# Patient Record
Sex: MALE | Race: WHITE | NOT HISPANIC OR LATINO | URBAN - METROPOLITAN AREA
[De-identification: names, ages, dates, MRNs, and addresses within clinical notes are randomized per-mention and may not be internally consistent; named-entity substitution may affect disease eponyms.]

---

## 2017-03-20 ENCOUNTER — EMERGENCY (EMERGENCY)
Facility: HOSPITAL | Age: 47
LOS: 1 days | Discharge: HOME | End: 2017-03-20
Attending: EMERGENCY MEDICINE | Admitting: EMERGENCY MEDICINE
Payer: MEDICAID

## 2017-03-20 VITALS
RESPIRATION RATE: 18 BRPM | DIASTOLIC BLOOD PRESSURE: 77 MMHG | SYSTOLIC BLOOD PRESSURE: 132 MMHG | OXYGEN SATURATION: 99 % | TEMPERATURE: 98 F | HEART RATE: 102 BPM

## 2017-03-20 DIAGNOSIS — E11.9 TYPE 2 DIABETES MELLITUS WITHOUT COMPLICATIONS: ICD-10-CM

## 2017-03-20 DIAGNOSIS — M79.662 PAIN IN LEFT LOWER LEG: ICD-10-CM

## 2017-03-20 DIAGNOSIS — R20.0 ANESTHESIA OF SKIN: ICD-10-CM

## 2017-03-20 PROCEDURE — 99283 EMERGENCY DEPT VISIT LOW MDM: CPT

## 2017-03-20 NOTE — ED PROVIDER NOTE - MEDICAL DECISION MAKING DETAILS
numbness in LE, sensation on exam intact b/l, no crepitus, no deformity, no obvious swelling, no crepitus, pulses intact distally, will US, no indication for emergent neuroimaging, low suspicion for acute stroke

## 2017-03-20 NOTE — ED PROVIDER NOTE - PROGRESS NOTE DETAILS
pt declines US, will return if sx not improving or worsening, explained w/o US, may miss a potentially life threatening condition, pt understands, has capacity, still declines diagnostic workup.

## 2017-03-20 NOTE — ED PROVIDER NOTE - PHYSICAL EXAMINATION
CON: ao x 3, HENMT: clear oropharynx, soft neck, HEAD: atraumatic, SKIN: no rash, MSK: no LE edema, L calf tender, neg Felix, full ROM w/ dorsi/plantarflexion, no crepitus, no deformity, NEURO: strength 5/5 in dorsi/plantarflexion, strength 5/5 b/l hip flexion, ambulatory steadily, CON: ao x 3, HENMT: clear oropharynx, soft neck, HEAD: atraumatic, SKIN: no rash, MSK: no LE edema, L calf tender, neg Felix, full ROM w/ dorsi/plantarflexion, no crepitus, no deformity, no obvious swelling noted, pedal pulses intact, NEURO: strength 5/5 in dorsi/plantarflexion, strength 5/5 b/l hip flexion, ambulatory steadily,

## 2017-03-20 NOTE — ED PROVIDER NOTE - OBJECTIVE STATEMENT
46 yom pw L calf "numbness, cramp and pain" x 1 day.  states works a lot on his feet, climbing ladders.  no trauma, no immobilization, no hx of PE/DVT.  hx of DM, states the numbness feels like pins and needles, improving as the day went by.  No headache, no weakness, no change in speech, no fever.  no other complaints.

## 2017-03-21 ENCOUNTER — EMERGENCY (EMERGENCY)
Facility: HOSPITAL | Age: 47
LOS: 1 days | Discharge: LEFT BEFORE DISCHARGE | End: 2017-03-21
Attending: EMERGENCY MEDICINE | Admitting: EMERGENCY MEDICINE
Payer: MEDICAID

## 2017-03-21 VITALS
DIASTOLIC BLOOD PRESSURE: 85 MMHG | RESPIRATION RATE: 18 BRPM | SYSTOLIC BLOOD PRESSURE: 142 MMHG | OXYGEN SATURATION: 100 % | TEMPERATURE: 98 F | HEART RATE: 70 BPM

## 2017-03-21 VITALS
RESPIRATION RATE: 16 BRPM | HEART RATE: 85 BPM | DIASTOLIC BLOOD PRESSURE: 67 MMHG | SYSTOLIC BLOOD PRESSURE: 127 MMHG | OXYGEN SATURATION: 98 % | TEMPERATURE: 98 F

## 2017-03-21 DIAGNOSIS — M79.605 PAIN IN LEFT LEG: ICD-10-CM

## 2017-03-21 DIAGNOSIS — E11.9 TYPE 2 DIABETES MELLITUS WITHOUT COMPLICATIONS: ICD-10-CM

## 2017-03-21 DIAGNOSIS — R20.2 PARESTHESIA OF SKIN: ICD-10-CM

## 2017-03-21 PROCEDURE — 99284 EMERGENCY DEPT VISIT MOD MDM: CPT

## 2017-03-21 RX ORDER — KETOROLAC TROMETHAMINE 30 MG/ML
60 SYRINGE (ML) INJECTION ONCE
Qty: 0 | Refills: 0 | Status: COMPLETED | OUTPATIENT
Start: 2017-03-21 | End: 2017-03-21

## 2017-03-21 NOTE — ED PROVIDER NOTE - NS ED MD SCRIBE ATTENDING SCRIBE SECTIONS
RESULTS/HISTORY OF PRESENT ILLNESS/CONSULTATIONS/SHIFT CHANGE/PROGRESS NOTE/HIV/PHYSICAL EXAM/PAST MEDICAL/SURGICAL/SOCIAL HISTORY/VITAL SIGNS( Pullset)/INTAKE ASSESSMENT/SCREENINGS/DISPOSITION/REVIEW OF SYSTEMS

## 2017-03-21 NOTE — ED PROVIDER NOTE - OBJECTIVE STATEMENT
47 yo M w/ Hx of DM presents with persistent left calf pain since yesterday. Pt was seen in the ED yesterday but refused ultrasound. Pt returned today due to increased discomfort and pain radiating up to left thigh. Pt notes pain started after misstep on ladder (pt is a ); pt thought he twisted his ankle/calf. Pt also notes tingling in foot and is concerned b/c he has Hx of DM. Pt has not taken any pain meds.

## 2017-03-21 NOTE — ED PROVIDER NOTE - MUSCULOSKELETAL, MLM
Spine appears normal, range of motion is not limited, no leg swelling, +tenderness to medial gastrocnemius muscle.

## 2017-03-21 NOTE — ED PROVIDER NOTE - PROGRESS NOTE DETAILS
Patient expressed desire to not stay to an [facility] then eloped. DDimer neg. low risk for DVT. I suspect a medial gastroc strain.

## 2020-12-02 ENCOUNTER — HOSPITAL ENCOUNTER (EMERGENCY)
Facility: HOSPITAL | Age: 50
Discharge: HOME | End: 2020-12-02
Attending: EMERGENCY MEDICINE
Payer: COMMERCIAL

## 2020-12-02 VITALS
DIASTOLIC BLOOD PRESSURE: 80 MMHG | RESPIRATION RATE: 18 BRPM | OXYGEN SATURATION: 99 % | SYSTOLIC BLOOD PRESSURE: 130 MMHG | TEMPERATURE: 98.2 F | HEART RATE: 98 BPM | WEIGHT: 150 LBS

## 2020-12-02 DIAGNOSIS — H60.91 RIGHT OTITIS EXTERNA: Primary | ICD-10-CM

## 2020-12-02 DIAGNOSIS — I83.90 VARICOSE VEIN OF LEG: ICD-10-CM

## 2020-12-02 LAB
ALBUMIN SERPL BCP-MCNC: 4 G/DL (ref 3.5–5)
ALP SERPL-CCNC: 100 U/L (ref 46–116)
ALT SERPL W P-5'-P-CCNC: 61 U/L (ref 12–78)
ANION GAP SERPL CALCULATED.3IONS-SCNC: 13 MMOL/L (ref 4–13)
AST SERPL W P-5'-P-CCNC: 35 U/L (ref 5–45)
BASOPHILS # BLD AUTO: 0.06 THOUSANDS/ΜL (ref 0–0.1)
BASOPHILS NFR BLD AUTO: 1 % (ref 0–1)
BILIRUB DIRECT SERPL-MCNC: 0.15 MG/DL (ref 0–0.2)
BILIRUB SERPL-MCNC: 0.4 MG/DL (ref 0.2–1)
BUN SERPL-MCNC: 11 MG/DL (ref 5–25)
CALCIUM SERPL-MCNC: 8.9 MG/DL (ref 8.3–10.1)
CHLORIDE SERPL-SCNC: 103 MMOL/L (ref 100–108)
CO2 SERPL-SCNC: 25 MMOL/L (ref 21–32)
CREAT SERPL-MCNC: 0.81 MG/DL (ref 0.6–1.3)
EOSINOPHIL # BLD AUTO: 0.16 THOUSAND/ΜL (ref 0–0.61)
EOSINOPHIL NFR BLD AUTO: 3 % (ref 0–6)
ERYTHROCYTE [DISTWIDTH] IN BLOOD BY AUTOMATED COUNT: 11.9 % (ref 11.6–15.1)
GFR SERPL CREATININE-BSD FRML MDRD: 105 ML/MIN/1.73SQ M
GLUCOSE SERPL-MCNC: 208 MG/DL (ref 65–140)
GLUCOSE SERPL-MCNC: 234 MG/DL (ref 65–140)
HCT VFR BLD AUTO: 44.9 % (ref 36.5–49.3)
HGB BLD-MCNC: 15 G/DL (ref 12–17)
IMM GRANULOCYTES # BLD AUTO: 0.01 THOUSAND/UL (ref 0–0.2)
IMM GRANULOCYTES NFR BLD AUTO: 0 % (ref 0–2)
LYMPHOCYTES # BLD AUTO: 2.11 THOUSANDS/ΜL (ref 0.6–4.47)
LYMPHOCYTES NFR BLD AUTO: 33 % (ref 14–44)
MCH RBC QN AUTO: 33.1 PG (ref 26.8–34.3)
MCHC RBC AUTO-ENTMCNC: 33.4 G/DL (ref 31.4–37.4)
MCV RBC AUTO: 99 FL (ref 82–98)
MONOCYTES # BLD AUTO: 0.53 THOUSAND/ΜL (ref 0.17–1.22)
MONOCYTES NFR BLD AUTO: 8 % (ref 4–12)
NEUTROPHILS # BLD AUTO: 3.59 THOUSANDS/ΜL (ref 1.85–7.62)
NEUTS SEG NFR BLD AUTO: 55 % (ref 43–75)
NRBC BLD AUTO-RTO: 0 /100 WBCS
PLATELET # BLD AUTO: 302 THOUSANDS/UL (ref 149–390)
PMV BLD AUTO: 9.4 FL (ref 8.9–12.7)
POTASSIUM SERPL-SCNC: 3.9 MMOL/L (ref 3.5–5.3)
PROT SERPL-MCNC: 7.6 G/DL (ref 6.4–8.2)
RBC # BLD AUTO: 4.53 MILLION/UL (ref 3.88–5.62)
SODIUM SERPL-SCNC: 141 MMOL/L (ref 136–145)
WBC # BLD AUTO: 6.46 THOUSAND/UL (ref 4.31–10.16)

## 2020-12-02 PROCEDURE — 36415 COLL VENOUS BLD VENIPUNCTURE: CPT | Performed by: EMERGENCY MEDICINE

## 2020-12-02 PROCEDURE — 83036 HEMOGLOBIN GLYCOSYLATED A1C: CPT | Performed by: EMERGENCY MEDICINE

## 2020-12-02 PROCEDURE — 99284 EMERGENCY DEPT VISIT MOD MDM: CPT

## 2020-12-02 PROCEDURE — 82948 REAGENT STRIP/BLOOD GLUCOSE: CPT

## 2020-12-02 PROCEDURE — 85025 COMPLETE CBC W/AUTO DIFF WBC: CPT | Performed by: EMERGENCY MEDICINE

## 2020-12-02 PROCEDURE — 99284 EMERGENCY DEPT VISIT MOD MDM: CPT | Performed by: EMERGENCY MEDICINE

## 2020-12-02 PROCEDURE — 80076 HEPATIC FUNCTION PANEL: CPT | Performed by: EMERGENCY MEDICINE

## 2020-12-02 PROCEDURE — 80048 BASIC METABOLIC PNL TOTAL CA: CPT | Performed by: EMERGENCY MEDICINE

## 2020-12-02 RX ORDER — CIPROFLOXACIN 500 MG/1
500 TABLET, FILM COATED ORAL ONCE
Status: DISCONTINUED | OUTPATIENT
Start: 2020-12-02 | End: 2020-12-02 | Stop reason: HOSPADM

## 2020-12-02 RX ORDER — CIPROFLOXACIN AND DEXAMETHASONE 3; 1 MG/ML; MG/ML
4 SUSPENSION/ DROPS AURICULAR (OTIC) 2 TIMES DAILY
Qty: 7.5 ML | Refills: 0 | Status: SHIPPED | OUTPATIENT
Start: 2020-12-02 | End: 2020-12-18 | Stop reason: ALTCHOICE

## 2020-12-02 RX ORDER — CIPROFLOXACIN 500 MG/1
500 TABLET, FILM COATED ORAL 2 TIMES DAILY
Qty: 20 TABLET | Refills: 0 | Status: SHIPPED | OUTPATIENT
Start: 2020-12-02 | End: 2020-12-12

## 2020-12-02 RX ORDER — CIPROFLOXACIN AND DEXAMETHASONE 3; 1 MG/ML; MG/ML
4 SUSPENSION/ DROPS AURICULAR (OTIC) 2 TIMES DAILY
Status: DISCONTINUED | OUTPATIENT
Start: 2020-12-02 | End: 2020-12-02 | Stop reason: HOSPADM

## 2020-12-02 RX ADMIN — SODIUM CHLORIDE 1000 ML: 0.9 INJECTION, SOLUTION INTRAVENOUS at 18:05

## 2020-12-03 LAB
EST. AVERAGE GLUCOSE BLD GHB EST-MCNC: 206 MG/DL
HBA1C MFR BLD: 8.8 %

## 2020-12-04 ENCOUNTER — TELEPHONE (OUTPATIENT)
Dept: VASCULAR SURGERY | Facility: CLINIC | Age: 50
End: 2020-12-04

## 2020-12-07 ENCOUNTER — CONSULT (OUTPATIENT)
Dept: VASCULAR SURGERY | Facility: CLINIC | Age: 50
End: 2020-12-07
Payer: COMMERCIAL

## 2020-12-07 VITALS
BODY MASS INDEX: 22.88 KG/M2 | TEMPERATURE: 97.6 F | HEIGHT: 68 IN | DIASTOLIC BLOOD PRESSURE: 90 MMHG | SYSTOLIC BLOOD PRESSURE: 160 MMHG | HEART RATE: 84 BPM | WEIGHT: 151 LBS

## 2020-12-07 DIAGNOSIS — H60.91 RIGHT OTITIS EXTERNA: ICD-10-CM

## 2020-12-07 DIAGNOSIS — I83.811 VARICOSE VEINS OF LEG WITH PAIN, RIGHT: Primary | ICD-10-CM

## 2020-12-07 PROCEDURE — 99242 OFF/OP CONSLTJ NEW/EST SF 20: CPT | Performed by: SURGERY

## 2020-12-07 RX ORDER — GABAPENTIN 300 MG/1
300 CAPSULE ORAL 3 TIMES DAILY
COMMUNITY
Start: 2020-02-21 | End: 2021-02-20

## 2020-12-07 RX ORDER — UREA 10 %
400 LOTION (ML) TOPICAL DAILY
COMMUNITY
Start: 2020-05-26 | End: 2021-05-26

## 2020-12-07 RX ORDER — INSULIN GLARGINE 100 [IU]/ML
30 INJECTION, SOLUTION SUBCUTANEOUS DAILY
COMMUNITY
Start: 2020-11-22

## 2020-12-07 RX ORDER — ATORVASTATIN CALCIUM 40 MG/1
40 TABLET, FILM COATED ORAL DAILY
COMMUNITY
Start: 2020-03-11 | End: 2021-03-11

## 2020-12-07 RX ORDER — NAPROXEN 500 MG/1
500 TABLET ORAL 2 TIMES DAILY WITH MEALS
COMMUNITY
Start: 2020-09-08

## 2020-12-07 RX ORDER — CHOLECALCIFEROL (VITAMIN D3) 125 MCG
500 CAPSULE ORAL DAILY
COMMUNITY
Start: 2020-09-01

## 2020-12-07 RX ORDER — LISINOPRIL 2.5 MG/1
2.5 TABLET ORAL DAILY
COMMUNITY
Start: 2020-03-20 | End: 2021-03-20

## 2020-12-07 RX ORDER — INSULIN ASPART 100 [IU]/ML
INJECTION, SOLUTION INTRAVENOUS; SUBCUTANEOUS
COMMUNITY
Start: 2020-08-18

## 2020-12-18 RX ORDER — OFLOXACIN 3 MG/ML
10 SOLUTION AURICULAR (OTIC) DAILY
Qty: 5 ML | Refills: 0 | Status: SHIPPED | OUTPATIENT
Start: 2020-12-18

## 2021-07-27 NOTE — ED PROVIDER NOTE - GASTROINTESTINAL, MLM
Abdomen soft, non-tender, no guarding. Normal vision: sees adequately in most situations; can see medication labels, newsprint

## 2021-10-05 ENCOUNTER — APPOINTMENT (OUTPATIENT)
Dept: LAB | Facility: CLINIC | Age: 51
End: 2021-10-05
Payer: COMMERCIAL

## 2021-10-05 DIAGNOSIS — E10.40 DIABETIC NEUROPATHY, TYPE I DIABETES MELLITUS (HCC): ICD-10-CM

## 2021-10-05 LAB
ALBUMIN SERPL BCP-MCNC: 3.8 G/DL (ref 3.5–5)
ALP SERPL-CCNC: 94 U/L (ref 46–116)
ALT SERPL W P-5'-P-CCNC: 31 U/L (ref 12–78)
ANION GAP SERPL CALCULATED.3IONS-SCNC: 4 MMOL/L (ref 4–13)
AST SERPL W P-5'-P-CCNC: 17 U/L (ref 5–45)
BILIRUB SERPL-MCNC: 0.42 MG/DL (ref 0.2–1)
BUN SERPL-MCNC: 15 MG/DL (ref 5–25)
CALCIUM SERPL-MCNC: 9.4 MG/DL (ref 8.3–10.1)
CHLORIDE SERPL-SCNC: 102 MMOL/L (ref 100–108)
CHOLEST SERPL-MCNC: 135 MG/DL (ref 50–200)
CO2 SERPL-SCNC: 28 MMOL/L (ref 21–32)
CREAT SERPL-MCNC: 0.84 MG/DL (ref 0.6–1.3)
GFR SERPL CREATININE-BSD FRML MDRD: 103 ML/MIN/1.73SQ M
GLUCOSE P FAST SERPL-MCNC: 235 MG/DL (ref 65–99)
HDLC SERPL-MCNC: 55 MG/DL
LDLC SERPL CALC-MCNC: 64 MG/DL (ref 0–100)
NONHDLC SERPL-MCNC: 80 MG/DL
POTASSIUM SERPL-SCNC: 4.8 MMOL/L (ref 3.5–5.3)
PROT SERPL-MCNC: 6.9 G/DL (ref 6.4–8.2)
SODIUM SERPL-SCNC: 134 MMOL/L (ref 136–145)
TRIGL SERPL-MCNC: 81 MG/DL

## 2021-10-05 PROCEDURE — 36415 COLL VENOUS BLD VENIPUNCTURE: CPT

## 2021-10-05 PROCEDURE — 82985 ASSAY OF GLYCATED PROTEIN: CPT

## 2021-10-05 PROCEDURE — 80053 COMPREHEN METABOLIC PANEL: CPT

## 2021-10-05 PROCEDURE — 80061 LIPID PANEL: CPT

## 2021-10-05 PROCEDURE — 84378 SUGARS SINGLE QUANT: CPT

## 2021-10-05 PROCEDURE — 84681 ASSAY OF C-PEPTIDE: CPT

## 2021-10-06 LAB
C PEPTIDE SERPL-MCNC: <0.1 NG/ML (ref 1.1–4.4)
FRUCTOSAMINE SERPL-SCNC: 346 UMOL/L (ref 0–285)

## 2021-10-08 LAB — 1,5-ANHYDROGLUCITOL SERPL-MCNC: 3.5 UG/ML

## 2022-07-26 ENCOUNTER — TELEPHONE (OUTPATIENT)
Dept: NEUROLOGY | Facility: CLINIC | Age: 52
End: 2022-07-26

## 2022-12-22 ENCOUNTER — TELEPHONE (OUTPATIENT)
Dept: NEUROLOGY | Facility: CLINIC | Age: 52
End: 2022-12-22

## 2022-12-23 NOTE — TELEPHONE ENCOUNTER
Pt left  stating that he was calling to confirm his appt today at 2pm , asking for a call back if any changes      [phone]

## 2022-12-23 NOTE — TELEPHONE ENCOUNTER
patient called to reschedule due to the weather in his town  I offered him 5-11-23 at 330 pm and added him to the wait list and he accepted

## 2023-03-06 RX ORDER — HYDROXYZINE 50 MG/1
50 TABLET, FILM COATED ORAL 2 TIMES DAILY PRN
COMMUNITY
Start: 2022-12-09

## 2023-03-06 RX ORDER — DULOXETIN HYDROCHLORIDE 30 MG/1
30 CAPSULE, DELAYED RELEASE ORAL DAILY
COMMUNITY
Start: 2023-02-21 | End: 2023-06-21

## 2023-03-06 RX ORDER — ACETAMINOPHEN 500 MG
500 TABLET ORAL EVERY 4 HOURS PRN
COMMUNITY
Start: 2022-12-05 | End: 2023-03-10

## 2023-03-06 RX ORDER — ZOLPIDEM TARTRATE 10 MG/1
10 TABLET ORAL
COMMUNITY
Start: 2022-12-09

## 2023-03-06 RX ORDER — FEXOFENADINE HYDROCHLORIDE 60 MG/1
60 TABLET, FILM COATED ORAL DAILY
COMMUNITY
Start: 2022-12-05

## 2023-03-06 RX ORDER — IBUPROFEN 800 MG/1
TABLET ORAL
COMMUNITY
Start: 2022-12-05

## 2023-03-06 RX ORDER — PANTOPRAZOLE SODIUM 40 MG/1
40 TABLET, DELAYED RELEASE ORAL DAILY
COMMUNITY
Start: 2022-12-05

## 2023-03-06 RX ORDER — PREGABALIN 25 MG/1
1 CAPSULE ORAL 2 TIMES DAILY
COMMUNITY
Start: 2022-08-29

## 2023-03-06 RX ORDER — LISINOPRIL 5 MG/1
5 TABLET ORAL DAILY
COMMUNITY
Start: 2022-12-05

## 2023-03-06 RX ORDER — BUPROPION HYDROCHLORIDE 300 MG/1
300 TABLET ORAL EVERY MORNING
COMMUNITY
Start: 2022-12-09

## 2023-03-06 RX ORDER — INSULIN GLULISINE 100 [IU]/ML
INJECTION, SOLUTION SUBCUTANEOUS
COMMUNITY
Start: 2022-11-01

## 2023-03-06 RX ORDER — METHYLPHENIDATE HYDROCHLORIDE 18 MG/1
18 TABLET, EXTENDED RELEASE ORAL DAILY
COMMUNITY
Start: 2022-11-11

## 2023-03-06 RX ORDER — METHYLPHENIDATE HYDROCHLORIDE 36 MG/1
36 TABLET ORAL EVERY MORNING
COMMUNITY
Start: 2022-12-09 | End: 2023-03-10

## 2023-03-06 RX ORDER — INSULIN GLULISINE 100 [IU]/ML
INJECTION, SOLUTION SUBCUTANEOUS
COMMUNITY
Start: 2022-12-17

## 2023-03-10 ENCOUNTER — OFFICE VISIT (OUTPATIENT)
Dept: GASTROENTEROLOGY | Facility: CLINIC | Age: 53
End: 2023-03-10

## 2023-03-10 VITALS
HEIGHT: 69 IN | DIASTOLIC BLOOD PRESSURE: 84 MMHG | BODY MASS INDEX: 22.63 KG/M2 | HEART RATE: 80 BPM | WEIGHT: 152.8 LBS | SYSTOLIC BLOOD PRESSURE: 124 MMHG

## 2023-03-10 DIAGNOSIS — Z12.11 COLON CANCER SCREENING: ICD-10-CM

## 2023-03-10 DIAGNOSIS — K21.9 GASTROESOPHAGEAL REFLUX DISEASE WITHOUT ESOPHAGITIS: Primary | ICD-10-CM

## 2023-03-10 RX ORDER — DEXTROAMPHETAMINE SACCHARATE, AMPHETAMINE ASPARTATE MONOHYDRATE, DEXTROAMPHETAMINE SULFATE AND AMPHETAMINE SULFATE 5; 5; 5; 5 MG/1; MG/1; MG/1; MG/1
20 CAPSULE, EXTENDED RELEASE ORAL EVERY MORNING
COMMUNITY
Start: 2023-02-22

## 2023-03-10 NOTE — PROGRESS NOTES
Consultation - [facility]  Jo Ann Wilsondomi 1970 50 y o  male     ASSESSMENT @ PLAN:   He is a 53-year-old male with chronic gastroesophageal reflux disease with severe worsening of heartburn and regurgitation over the last year requiring higher doses of omeprazole and switching to pantoprazole which now appears to be working  In addition he needs colon cancer screening    1 he will continue on the pantoprazole 40 mg daily    2 we will do an endoscopy and colonoscopy to investigate    3 reflux precautions were reviewed with the patient  I told him to stop smoking and to use less NSAIDs and to control his diabetes    Chief Complaint: GERD and colon cancer screening    HPI: He is a 53-year-old male with gastroesophageal reflux disease with severe worsening of reflux over the last year  He has required higher doses of omeprazole and switching to pantoprazole has helped  He denies solid or liquid food dysphagia he has no globus  He has occasional nausea and vomiting  He has no melena or hematochezia  He has multiple risk factors for reflux  He is thin he is not obese  He does take high-dose ibuprofen at times  He does smoke cigarettes  He reports heartburn and regurgitation with heavy lifting he has a very physical job  He reports a long history of reflux for 10 years but worsening over the last year  He does not report a lot of stress  Nobody in the family had esophagus cancer or stomach cancer or colon cancer  He is never had colon cancer screening    REVIEW OF SYSTEMS:     CONSTITUTIONAL: Denies any fever, chills, or rigors  Good appetite, and no recent weight loss  HEENT: No earache or tinnitus  Denies hearing loss or visual disturbances  CARDIOVASCULAR: No chest pain or palpitations  RESPIRATORY: Denies any cough, hemoptysis, shortness of breath or dyspnea on exertion  GASTROINTESTINAL: As noted in the History of Present Illness     GENITOURINARY: No problems with urination  Denies any hematuria or dysuria  NEUROLOGIC: No dizziness or vertigo, denies headaches  MUSCULOSKELETAL: Denies any muscle or joint pain  SKIN: Denies skin rashes or itching  ENDOCRINE: Denies excessive thirst  Denies intolerance to heat or cold  PSYCHOSOCIAL: Denies depression or anxiety  Denies any recent memory loss  Past Medical History:   Diagnosis Date   • Diabetes mellitus ([facility] [address] )    • GERD (gastroesophageal reflux disease)    • Hyperlipidemia    • Hypertension       Past Surgical History:   Procedure Laterality Date   • LEG SURGERY       Social History     Socioeconomic History   • Marital status: Single     Spouse name: Not on file   • Number of children: Not on file   • Years of education: Not on file   • Highest education level: Not on file   Occupational History   • Not on file   Tobacco Use   • Smoking status: Every Day     Packs/day: 1 00     Types: Cigarettes   • Smokeless tobacco: Never   Vaping Use   • Vaping Use: Never used   Substance and Sexual Activity   • Alcohol use:  Yes   • Drug use: Yes     Types: Marijuana   • Sexual activity: Not on file   Other Topics Concern   • Not on file   Social History Narrative   • Not on file     Social Determinants of Health     Financial Resource Strain: Not on file   Food Insecurity: Not on file   Transportation Needs: Not on file   Physical Activity: Not on file   Stress: Not on file   Social Connections: Not on file   Intimate Partner Violence: Not on file   Housing Stability: Not on file     Family History   Problem Relation Age of Onset   • No Known Problems Mother    • No Known Problems Father    • No Known Problems Sister    • No Known Problems Brother    • No Known Problems Maternal Grandmother    • No Known Problems Maternal Grandfather    • No Known Problems Paternal Grandmother    • No Known Problems Paternal Grandfather    • No Known Problems Maternal Aunt    • No Known Problems Maternal Uncle    • No Known Problems Paternal Aunt • No Known Problems Paternal Uncle    • No Known Problems Cousin      Pollen extract  Current Outpatient Medications   Medication Sig Dispense Refill   • amphetamine-dextroamphetamine (ADDERALL XR) 20 MG 24 hr capsule Take 20 mg by mouth every morning     • Apidra SoloStar 100 units/mL injection pen TO BE USED VIA INSULIN PUMP  UP  UNITS/DAY, NORMAL     • buPROPion (WELLBUTRIN XL) 300 mg 24 hr tablet Take 300 mg by mouth every morning     • DULoxetine (CYMBALTA) 30 mg delayed release capsule Take 30 mg by mouth daily     • Elastic Bandages & Supports (Medical Compression Stockings) MISC Use daily Knee High 20-30mmHg 2 each 4   • Elastic Bandages & Supports (Medical Compression Thigh High) MISC Use daily 20-30mmHg 2 each 4   • fexofenadine (ALLEGRA) 60 MG tablet Take 60 mg by mouth daily     • hydrOXYzine HCL (ATARAX) 50 mg tablet Take 50 mg by mouth 2 (two) times a day as needed     • ibuprofen (MOTRIN) 800 mg tablet TAKE 1 TABLET (800 MG TOTAL) BY MOUTH DAILY AS NEEDED FOR MODERATE PAIN (PAIN SCORE 4-6)  • insulin aspart (NovoLOG FlexPen) 100 UNIT/ML injection pen INJECT 30 UNITS UNDER THE SKIN 3 (THREE) TIMES A DAY BEFORE MEALS  PT IS ON SLIDING SCALE     • insulin glulisine (Apidra SoloStar) 100 units/mL injection pen To be used via insulin pump   Up to 100 units/day, Normal     • Lantus SoloStar 100 units/mL injection pen 30 Units daily     • lisinopril (ZESTRIL) 5 mg tablet Take 5 mg by mouth daily     • Methylphenidate HCl ER 18 MG TB24 Take 18 mg by mouth daily     • pantoprazole (PROTONIX) 40 mg tablet Take 40 mg by mouth daily     • pregabalin (LYRICA) 25 mg capsule Take 1 capsule by mouth 2 (two) times a day     • vitamin B-12 (VITAMIN B-12) 500 mcg tablet Take 500 mcg by mouth daily     • zolpidem (AMBIEN) 10 mg tablet Take 10 mg by mouth daily at bedtime as needed     • atorvastatin (LIPITOR) 40 mg tablet Take 40 mg by mouth daily     • folic acid (FOLVITE) 170 MCG tablet Take 400 mcg by mouth daily     • gabapentin (NEURONTIN) 300 mg capsule Take 300 mg by mouth Three times a day       No current facility-administered medications for this visit  Blood pressure 124/84, pulse 80, height 5' 9" (1 753 m), weight 69 3 kg (152 lb 12 8 oz)  PHYSICAL EXAM:     General Appearance:   Alert, cooperative, no distress, appears stated age    HEENT:   Normocephalic, atraumatic, anicteric      Neck:  Supple, symmetrical, trachea midline, no adenopathy;    thyroid: no enlargement/tenderness/nodules; no carotid  bruit or JVD    Lungs:   Clear to auscultation bilaterally; no rales, rhonchi or wheezing; respirations unlabored    Heart[de-identified]   S1 and S2 normal; regular rate and rhythm; no murmur, rub, or gallop     Abdomen:   Soft, non-tender, non-distended; normal bowel sounds; no masses, no organomegaly    Genitalia:   Deferred    Rectal:   Deferred    Extremities:  No cyanosis, clubbing or edema    Pulses:  2+ and symmetric all extremities    Skin:  Skin color, texture, turgor normal, no rashes or lesions    Lymph nodes:  No palpable cervical, axillary or inguinal lymphadenopathy        Lab Results   Component Value Date    WBC 6 46 12/02/2020    HGB 15 0 12/02/2020    HCT 44 9 12/02/2020    MCV 99 (H) 12/02/2020     12/02/2020     Lab Results   Component Value Date    CALCIUM 9 4 10/05/2021    K 4 8 10/05/2021    CO2 28 10/05/2021     10/05/2021    BUN 15 10/05/2021    CREATININE 0 84 10/05/2021     Lab Results   Component Value Date    ALT 31 10/05/2021    AST 17 10/05/2021    ALKPHOS 94 10/05/2021     No results found for: INR, PROTIME

## 2023-03-10 NOTE — PATIENT INSTRUCTIONS
Scheduled date of EGD/colonoscopy (as of today):5/26/23  Physician performing EGD/colonoscopy: Alexis  Location of EGD/colonoscopy:[facility]  Desired bowel prep reviewed with patient:Dulco/Miralax  Instructions reviewed with patient by:Galo snyder  Clearances:   none

## 2023-03-21 ENCOUNTER — TELEPHONE (OUTPATIENT)
Dept: OTHER | Facility: OTHER | Age: 53
End: 2023-03-21

## 2023-03-21 NOTE — TELEPHONE ENCOUNTER
Pt  Has a procedure scheduled for 5/26/23/at noon and he wants to know if there is anything sooner  I believe he mentioned something about his insurance  Can you please give him a call back

## 2023-03-22 NOTE — TELEPHONE ENCOUNTER
Left message advising patient that we don't have anything sooner right now but will add him to the wait list

## 2023-05-09 ENCOUNTER — TELEPHONE (OUTPATIENT)
Dept: NEUROLOGY | Facility: CLINIC | Age: 53
End: 2023-05-09

## 2023-05-09 PROBLEM — Z12.11 COLON CANCER SCREENING: Status: RESOLVED | Noted: 2023-03-10 | Resolved: 2023-05-09

## 2023-05-09 NOTE — TELEPHONE ENCOUNTER
Patient phone goes straight to voicemail  I left a message for the patient asking him to come in early at 1 or 2  The provider will not be available at original time of appointment (3:30pm)

## 2023-05-19 ENCOUNTER — TELEPHONE (OUTPATIENT)
Dept: GASTROENTEROLOGY | Facility: CLINIC | Age: 53
End: 2023-05-19

## 2023-05-26 ENCOUNTER — ANESTHESIA (OUTPATIENT)
Dept: GASTROENTEROLOGY | Facility: HOSPITAL | Age: 53
End: 2023-05-26

## 2023-05-26 ENCOUNTER — ANESTHESIA EVENT (OUTPATIENT)
Dept: GASTROENTEROLOGY | Facility: HOSPITAL | Age: 53
End: 2023-05-26

## 2023-05-26 ENCOUNTER — HOSPITAL ENCOUNTER (OUTPATIENT)
Dept: GASTROENTEROLOGY | Facility: HOSPITAL | Age: 53
Setting detail: OUTPATIENT SURGERY
End: 2023-05-26
Attending: INTERNAL MEDICINE

## 2023-05-26 VITALS
SYSTOLIC BLOOD PRESSURE: 118 MMHG | WEIGHT: 145.28 LBS | RESPIRATION RATE: 17 BRPM | HEART RATE: 78 BPM | BODY MASS INDEX: 22.02 KG/M2 | TEMPERATURE: 97.4 F | DIASTOLIC BLOOD PRESSURE: 75 MMHG | OXYGEN SATURATION: 98 % | HEIGHT: 68 IN

## 2023-05-26 DIAGNOSIS — Z12.11 COLON CANCER SCREENING: ICD-10-CM

## 2023-05-26 DIAGNOSIS — K21.9 GASTROESOPHAGEAL REFLUX DISEASE WITHOUT ESOPHAGITIS: ICD-10-CM

## 2023-05-26 LAB — GLUCOSE SERPL-MCNC: 222 MG/DL (ref 65–140)

## 2023-05-26 RX ORDER — SODIUM CHLORIDE, SODIUM LACTATE, POTASSIUM CHLORIDE, CALCIUM CHLORIDE 600; 310; 30; 20 MG/100ML; MG/100ML; MG/100ML; MG/100ML
INJECTION, SOLUTION INTRAVENOUS CONTINUOUS PRN
Status: DISCONTINUED | OUTPATIENT
Start: 2023-05-26 | End: 2023-05-26

## 2023-05-26 RX ORDER — PROPOFOL 10 MG/ML
INJECTION, EMULSION INTRAVENOUS AS NEEDED
Status: DISCONTINUED | OUTPATIENT
Start: 2023-05-26 | End: 2023-05-26

## 2023-05-26 RX ADMIN — PROPOFOL 100 MG: 10 INJECTION, EMULSION INTRAVENOUS at 10:55

## 2023-05-26 RX ADMIN — PROPOFOL 150 MG: 10 INJECTION, EMULSION INTRAVENOUS at 10:51

## 2023-05-26 RX ADMIN — SODIUM CHLORIDE, SODIUM LACTATE, POTASSIUM CHLORIDE, AND CALCIUM CHLORIDE: .6; .31; .03; .02 INJECTION, SOLUTION INTRAVENOUS at 10:47

## 2023-05-26 NOTE — ANESTHESIA PREPROCEDURE EVALUATION
Procedure:  COLONOSCOPY  EGD    Relevant Problems   GI/HEPATIC   (+) Gastroesophageal reflux disease without esophagitis      Diabetes mellitus (HCC)    GERD (gastroesophageal reflux disease)    Hyperlipidemia    Hypertension        Physical Exam    Airway    Mallampati score: II  TM Distance: >3 FB  Neck ROM: full     Dental       Cardiovascular  Cardiovascular exam normal    Pulmonary  Pulmonary exam normal     Other Findings        Anesthesia Plan  ASA Score- 2     Anesthesia Type- IV sedation with anesthesia with ASA Monitors  Additional Monitors:   Airway Plan:           Plan Factors-Exercise tolerance (METS): >4 METS  Chart reviewed  EKG reviewed  Imaging results reviewed  Existing labs reviewed  Patient summary reviewed  Patient is a current smoker  Patient instructed to abstain from smoking on day of procedure  Induction- intravenous  Postoperative Plan-     Informed Consent- Anesthetic plan and risks discussed with patient  I personally reviewed this patient with the CRNA  Discussed and agreed on the Anesthesia Plan with the CRNA  Miri Russ

## 2023-05-26 NOTE — ANESTHESIA POSTPROCEDURE EVALUATION
Post-Op Assessment Note    CV Status:  Stable  Pain Score: 0    Pain management: adequate     Mental Status:  Sleepy   Hydration Status:  Stable   PONV Controlled:  Controlled   Airway Patency:  Patent      Post Op Vitals Reviewed: Yes      Staff: Anesthesiologist, CRNA         No notable events documented      BP (!) 84/54 (05/26/23 1105)    Temp (!) 97 4 °F (36 3 °C) (05/26/23 1105)    Pulse 84 (05/26/23 1105)   Resp 18 (05/26/23 1105)    SpO2 95 % (05/26/23 1105)

## 2023-05-26 NOTE — H&P
"History and Physical -  [facility]  Dilip Oliver 48 y o  male MRN: [id number]                  HPI: Dilip Oliver is a 48y o  year old male who presents for endoscopy and colonoscopy for GERD and colon cancer screening      REVIEW OF SYSTEMS: Per the HPI, and otherwise unremarkable  Historical Information   Past Medical History:   Diagnosis Date   • Diabetes mellitus ([address] )    • GERD (gastroesophageal reflux disease)    • Hyperlipidemia    • Hypertension      Past Surgical History:   Procedure Laterality Date   • HAND NERVE REPAIR Right    • LEG SURGERY       Social History   Social History     Substance and Sexual Activity   Alcohol Use Yes   • Alcohol/week: 2 0 standard drinks of alcohol   • Types: 2 Standard drinks or equivalent per week     Social History     Substance and Sexual Activity   Drug Use Yes   • Frequency: 7 0 times per week   • Types: Marijuana     Social History     Tobacco Use   Smoking Status Every Day   • Packs/day: 1 00   • Types: Cigarettes   Smokeless Tobacco Never     Family History   Problem Relation Age of Onset   • No Known Problems Mother    • No Known Problems Father    • No Known Problems Sister    • No Known Problems Brother    • No Known Problems Maternal Grandmother    • No Known Problems Maternal Grandfather    • No Known Problems Paternal Grandmother    • No Known Problems Paternal Grandfather    • No Known Problems Maternal Aunt    • No Known Problems Maternal Uncle    • No Known Problems Paternal Aunt    • No Known Problems Paternal Uncle    • No Known Problems Cousin        Meds/Allergies     (Not in a hospital admission)      Allergies   Allergen Reactions   • Pollen Extract Sneezing       Objective     Blood pressure 139/81, pulse 85, temperature 97 6 °F (36 4 °C), temperature source Temporal, resp  rate 20, height 5' 8\" (1 727 m), weight 65 9 kg (145 lb 4 5 oz), SpO2 98 %        PHYSICAL EXAM    /81   Pulse 85   Temp 97 6 °F (36 4 °C) " "(Temporal)   Resp 20   Ht 5' 8\" (1 727 m)   Wt 65 9 kg (145 lb 4 5 oz)   SpO2 98%   BMI 22 09 kg/m²       Gen: NAD  CV: RRR  CHEST: Clear  ABD: soft, NT/ND  EXT: no edema      ASSESSMENT/PLAN:  This is a 48y o  year old male here for endoscopy and colonoscopy, and he is stable and optimized for his procedure        "

## 2023-06-07 ENCOUNTER — TELEPHONE (OUTPATIENT)
Dept: GASTROENTEROLOGY | Facility: CLINIC | Age: 53
End: 2023-06-07

## 2023-06-07 NOTE — TELEPHONE ENCOUNTER
Called patient at number listed  Got VM  Could not leave a message as mailbox was full   Will try again later or tommorow

## 2023-06-07 NOTE — TELEPHONE ENCOUNTER
----- Message from Amanda Iniguez MD sent at 6/7/2023  8:14 AM EDT -----  Please review with the patient

## 2023-06-08 ENCOUNTER — TELEPHONE (OUTPATIENT)
Dept: GASTROENTEROLOGY | Facility: CLINIC | Age: 53
End: 2023-06-08

## 2023-06-08 NOTE — TELEPHONE ENCOUNTER
Called patient and got  LMOM with test results  Left office # as well  Will route to joseline for a 1 year EGD recall

## 2023-06-08 NOTE — TELEPHONE ENCOUNTER
----- Message from Jacques Espinoza III, MD sent at 6/7/2023  8:14 AM EDT -----  Please review with the patient

## 2023-06-08 NOTE — TELEPHONE ENCOUNTER
----- Message from Stephanie Byrd MD sent at 6/7/2023  8:14 AM EDT -----  Please review with the patient

## 2023-09-14 ENCOUNTER — HOSPITAL ENCOUNTER (EMERGENCY)
Facility: HOSPITAL | Age: 53
Discharge: HOME | End: 2023-09-14
Attending: EMERGENCY MEDICINE
Payer: COMMERCIAL

## 2023-09-14 VITALS
SYSTOLIC BLOOD PRESSURE: 133 MMHG | OXYGEN SATURATION: 98 % | HEART RATE: 101 BPM | TEMPERATURE: 97.8 F | RESPIRATION RATE: 18 BRPM | DIASTOLIC BLOOD PRESSURE: 88 MMHG

## 2023-09-14 DIAGNOSIS — L03.90 CELLULITIS: Primary | ICD-10-CM

## 2023-09-14 PROCEDURE — 99284 EMERGENCY DEPT VISIT MOD MDM: CPT | Performed by: PHYSICIAN ASSISTANT

## 2023-09-14 PROCEDURE — 99283 EMERGENCY DEPT VISIT LOW MDM: CPT

## 2023-09-14 RX ORDER — GINSENG 100 MG
1 CAPSULE ORAL ONCE
Status: COMPLETED | OUTPATIENT
Start: 2023-09-14 | End: 2023-09-14

## 2023-09-14 RX ORDER — CEPHALEXIN 500 MG/1
500 CAPSULE ORAL 4 TIMES DAILY
Qty: 28 CAPSULE | Refills: 0 | Status: SHIPPED | OUTPATIENT
Start: 2023-09-14 | End: 2023-09-21

## 2023-09-14 RX ORDER — SULFAMETHOXAZOLE AND TRIMETHOPRIM 800; 160 MG/1; MG/1
1 TABLET ORAL 2 TIMES DAILY
Qty: 14 TABLET | Refills: 0 | Status: SHIPPED | OUTPATIENT
Start: 2023-09-14 | End: 2023-09-21

## 2023-09-14 RX ORDER — CEPHALEXIN 250 MG/1
500 CAPSULE ORAL ONCE
Status: COMPLETED | OUTPATIENT
Start: 2023-09-14 | End: 2023-09-14

## 2023-09-14 RX ORDER — SULFAMETHOXAZOLE AND TRIMETHOPRIM 800; 160 MG/1; MG/1
1 TABLET ORAL ONCE
Status: COMPLETED | OUTPATIENT
Start: 2023-09-14 | End: 2023-09-14

## 2023-09-14 RX ADMIN — BACITRACIN ZINC 1 LARGE APPLICATION: 500 OINTMENT TOPICAL at 20:42

## 2023-09-14 RX ADMIN — CEPHALEXIN 500 MG: 250 CAPSULE ORAL at 20:42

## 2023-09-14 RX ADMIN — SULFAMETHOXAZOLE AND TRIMETHOPRIM 1 TABLET: 800; 160 TABLET ORAL at 20:42

## 2023-09-15 NOTE — ED PROVIDER NOTES
History  Chief Complaint   Patient presents with   • Wound Check     trip and fell 2 weeks ago. Bilateral wounds on shin, redness, draining yellow pus. . Type 1 diabetic      53-year-old male patient here for evaluation of a wound on his right lower extremity. He tripped and fell and suffered a wound on the shin at that time. States in the past 2 to 3 days he has noticed redness there. Discharge. No fevers no chills no red streaking up the leg. He is a type I diabetic and blood sugars been under control      History provided by:  Patient   used: No        Prior to Admission Medications   Prescriptions Last Dose Informant Patient Reported? Taking? Apidra SoloStar 100 units/mL injection pen   Yes No   Sig: TO BE USED VIA INSULIN PUMP.  UP  UNITS/DAY, NORMAL   DULoxetine (CYMBALTA) 30 mg delayed release capsule   Yes No   Sig: Take 30 mg by mouth daily   Elastic Bandages & Supports (Medical Compression Stockings) MISC   No No   Sig: Use daily Knee High 20-30mmHg   Elastic Bandages & Supports (Medical Compression Thigh High) MISC   No No   Sig: Use daily 20-30mmHg   Lantus SoloStar 100 units/mL injection pen  Self Yes No   Si Units daily   Methylphenidate HCl ER 18 MG TB24   Yes No   Sig: Take 18 mg by mouth daily   amphetamine-dextroamphetamine (ADDERALL XR) 20 MG 24 hr capsule   Yes No   Sig: Take 20 mg by mouth every morning   atorvastatin (LIPITOR) 40 mg tablet  Self Yes No   Sig: Take 40 mg by mouth daily   buPROPion (WELLBUTRIN XL) 300 mg 24 hr tablet   Yes No   Sig: Take 300 mg by mouth every morning   fexofenadine (ALLEGRA) 60 MG tablet   Yes No   Sig: Take 60 mg by mouth daily   folic acid (FOLVITE) 939 MCG tablet  Self Yes No   Sig: Take 400 mcg by mouth daily   gabapentin (NEURONTIN) 300 mg capsule  Self Yes No   Sig: Take 300 mg by mouth Three times a day   hydrOXYzine HCL (ATARAX) 50 mg tablet   Yes No   Sig: Take 50 mg by mouth 2 (two) times a day as needed   ibuprofen (MOTRIN) 800 mg tablet   Yes No   Sig: TAKE 1 TABLET (800 MG TOTAL) BY MOUTH DAILY AS NEEDED FOR MODERATE PAIN (PAIN SCORE 4-6). insulin aspart (NovoLOG FlexPen) 100 UNIT/ML injection pen  Self Yes No   Sig: INJECT 30 UNITS UNDER THE SKIN 3 (THREE) TIMES A DAY BEFORE MEALS. PT IS ON SLIDING SCALE   insulin glulisine (Apidra SoloStar) 100 units/mL injection pen   Yes No   Sig: To be used via insulin pump. Up to 100 units/day, Normal   lisinopril (ZESTRIL) 5 mg tablet   Yes No   Sig: Take 5 mg by mouth daily   pantoprazole (PROTONIX) 40 mg tablet   Yes No   Sig: Take 40 mg by mouth daily   pregabalin (LYRICA) 25 mg capsule   Yes No   Sig: Take 1 capsule by mouth 2 (two) times a day   vitamin B-12 (VITAMIN B-12) 500 mcg tablet  Self Yes No   Sig: Take 500 mcg by mouth daily   zolpidem (AMBIEN) 10 mg tablet   Yes No   Sig: Take 10 mg by mouth daily at bedtime as needed      Facility-Administered Medications: None       Past Medical History:   Diagnosis Date   • Diabetes mellitus ([address])    • GERD (gastroesophageal reflux disease)    • Hyperlipidemia    • Hypertension        Past Surgical History:   Procedure Laterality Date   • HAND NERVE REPAIR Right    • LEG SURGERY         Family History   Problem Relation Age of Onset   • No Known Problems Mother    • No Known Problems Father    • No Known Problems Sister    • No Known Problems Brother    • No Known Problems Maternal Grandmother    • No Known Problems Maternal Grandfather    • No Known Problems Paternal Grandmother    • No Known Problems Paternal Grandfather    • No Known Problems Maternal Aunt    • No Known Problems Maternal Uncle    • No Known Problems Paternal Aunt    • No Known Problems Paternal Uncle    • No Known Problems Cousin      I have reviewed and agree with the history as documented.     E-Cigarette/Vaping   • E-Cigarette Use Never User      E-Cigarette/Vaping Substances     Social History     Tobacco Use   • Smoking status: Every Day     Packs/day: 1.00 Types: Cigarettes   • Smokeless tobacco: Never   Vaping Use   • Vaping Use: Never used   Substance Use Topics   • Alcohol use: Yes     Alcohol/week: 2.0 standard drinks of alcohol     Types: 2 Standard drinks or equivalent per week   • Drug use: Yes     Frequency: 7.0 times per week     Types: Marijuana       Review of Systems   Constitutional: Negative for chills and fever. HENT: Negative for ear pain and sore throat. Eyes: Negative for pain and visual disturbance. Respiratory: Negative for cough and shortness of breath. Cardiovascular: Negative for chest pain and palpitations. Gastrointestinal: Negative for abdominal pain and vomiting. Genitourinary: Negative for dysuria and hematuria. Musculoskeletal: Negative for arthralgias and back pain. Skin: Positive for rash and wound. Negative for color change. Neurological: Negative for seizures and syncope. All other systems reviewed and are negative. Physical Exam  Physical Exam  Vitals and nursing note reviewed. Constitutional:       General: He is not in acute distress. Appearance: He is well-developed. HENT:      Head: Normocephalic and atraumatic. Eyes:      Conjunctiva/sclera: Conjunctivae normal.   Cardiovascular:      Rate and Rhythm: Normal rate and regular rhythm. Heart sounds: No murmur heard. Pulmonary:      Effort: Pulmonary effort is normal. No respiratory distress. Breath sounds: Normal breath sounds. Abdominal:      Palpations: Abdomen is soft. Tenderness: There is no abdominal tenderness. Musculoskeletal:         General: No swelling. Cervical back: Neck supple. Skin:     General: Skin is warm and dry. Capillary Refill: Capillary refill takes less than 2 seconds. Neurological:      Mental Status: He is alert.    Psychiatric:         Mood and Affect: Mood normal.         Vital Signs  ED Triage Vitals [09/14/23 2016]   Temperature Pulse Respirations Blood Pressure SpO2   97.8 °F (36.6 °C) 101 18 133/88 98 %      Temp Source Heart Rate Source Patient Position - Orthostatic VS BP Location FiO2 (%)   Temporal Monitor Sitting Left arm --      Pain Score       --           Vitals:    09/14/23 2016   BP: 133/88   Pulse: 101   Patient Position - Orthostatic VS: Sitting         Visual Acuity      ED Medications  Medications   cephalexin (KEFLEX) capsule 500 mg (500 mg Oral Given 9/14/23 2042)   sulfamethoxazole-trimethoprim (BACTRIM DS) 800-160 mg per tablet 1 tablet (1 tablet Oral Given 9/14/23 2042)   bacitracin topical ointment 1 large application (1 large application Topical Given 9/14/23 2042)       Diagnostic Studies  Results Reviewed     None                 No orders to display              Procedures  Procedures         ED Course                                             Medical Decision Making  Differential diagnosis includes was not limited to cellulitis, abscess, doubt necrotizing soft tissue infection. Plan/MDM: 61-year-old with a wound and rash. Benign exam.  Appearance consistent with cellulitis. Start antibiotics. Outpatient follow-up. Patient understands and agrees with this plan. Risk  OTC drugs. Prescription drug management. Disposition  Final diagnoses:   Cellulitis - right lower extremity     Time reflects when diagnosis was documented in both MDM as applicable and the Disposition within this note     Time User Action Codes Description Comment    9/14/2023  8:32 PM Soraida DA SILVA Add [L03.90] Cellulitis     9/14/2023  8:32 PM Jennifer Rothman Modify [L03.90] Cellulitis right lower extremity      ED Disposition     ED Disposition   Discharge    Condition   Stable    Date/Time   Thu Sep 14, 2023  8:32 PM    Comment   Blanca Segal discharge to home/self care.                Follow-up Information     Follow up With Specialties Details Why Jesse Velázquez MD  Call in 1 day  [address] [id number] [facility]  [phone] Discharge Medication List as of 9/14/2023  8:33 PM      START taking these medications    Details   cephalexin (Keflex) 500 mg capsule Take 1 capsule (500 mg total) by mouth 4 (four) times a day for 7 days, Starting Thu 9/14/2023, Until Thu 9/21/2023, Print      sulfamethoxazole-trimethoprim (BACTRIM DS) 800-160 mg per tablet Take 1 tablet by mouth 2 (two) times a day for 7 days, Starting Thu 9/14/2023, Until Thu 9/21/2023, Print         CONTINUE these medications which have NOT CHANGED    Details   amphetamine-dextroamphetamine (ADDERALL XR) 20 MG 24 hr capsule Take 20 mg by mouth every morning, Starting Wed 2/22/2023, Historical Med      !! Apidra SoloStar 100 units/mL injection pen TO BE USED VIA INSULIN PUMP.  UP  UNITS/DAY, NORMAL, Historical Med      atorvastatin (LIPITOR) 40 mg tablet Take 40 mg by mouth daily, Starting Wed 3/11/2020, Until Thu 3/11/2021, Historical Med      buPROPion (WELLBUTRIN XL) 300 mg 24 hr tablet Take 300 mg by mouth every morning, Starting Fri 12/9/2022, Historical Med      DULoxetine (CYMBALTA) 30 mg delayed release capsule Take 30 mg by mouth daily, Starting Tue 2/21/2023, Until Wed 6/21/2023, Historical Med      !! Elastic Bandages & Supports (Medical Compression Stockings) MISC Use daily Knee High 20-30mmHg, Starting Mon 12/7/2020, Print      !! Elastic Bandages & Supports (Medical Compression Thigh High) MISC Use daily 20-30mmHg, Starting Mon 12/7/2020, Print      fexofenadine (ALLEGRA) 60 MG tablet Take 60 mg by mouth daily, Starting Mon 12/5/2022, Historical Med      folic acid (FOLVITE) 973 MCG tablet Take 400 mcg by mouth daily, Starting Tue 5/26/2020, Until Wed 5/26/2021, Historical Med      gabapentin (NEURONTIN) 300 mg capsule Take 300 mg by mouth Three times a day, Starting Fri 2/21/2020, Until Sat 2/20/2021, Historical Med      hydrOXYzine HCL (ATARAX) 50 mg tablet Take 50 mg by mouth 2 (two) times a day as needed, Starting Fri 12/9/2022, Historical Med ibuprofen (MOTRIN) 800 mg tablet TAKE 1 TABLET (800 MG TOTAL) BY MOUTH DAILY AS NEEDED FOR MODERATE PAIN (PAIN SCORE 4-6). , Historical Med      insulin aspart (NovoLOG FlexPen) 100 UNIT/ML injection pen INJECT 30 UNITS UNDER THE SKIN 3 (THREE) TIMES A DAY BEFORE MEALS. PT IS ON SLIDING SCALE, Historical Med      !! insulin glulisine (Apidra SoloStar) 100 units/mL injection pen To be used via insulin pump. Up to 100 units/day, Normal, Historical Med      Lantus SoloStar 100 units/mL injection pen 30 Units daily, Starting Sun 11/22/2020, Historical Med      lisinopril (ZESTRIL) 5 mg tablet Take 5 mg by mouth daily, Starting Mon 12/5/2022, Historical Med      Methylphenidate HCl ER 18 MG TB24 Take 18 mg by mouth daily, Starting Fri 11/11/2022, Historical Med      pantoprazole (PROTONIX) 40 mg tablet Take 40 mg by mouth daily, Starting Mon 12/5/2022, Historical Med      pregabalin (LYRICA) 25 mg capsule Take 1 capsule by mouth 2 (two) times a day, Starting Mon 8/29/2022, Historical Med      vitamin B-12 (VITAMIN B-12) 500 mcg tablet Take 500 mcg by mouth daily, Starting Tue 9/1/2020, Historical Med      zolpidem (AMBIEN) 10 mg tablet Take 10 mg by mouth daily at bedtime as needed, Starting Fri 12/9/2022, Historical Med       !! - Potential duplicate medications found. Please discuss with provider. No discharge procedures on file.     PDMP Review     None          ED Provider  Electronically Signed by           Ariel Dash PA-C  09/14/23 [id number]

## 2023-09-15 NOTE — DISCHARGE INSTRUCTIONS
Return to the Emergency Department sooner if increased rash, fever, vomiting, difficulty breathing, lethargy, pain.

## 2023-09-19 ENCOUNTER — HOSPITAL ENCOUNTER (EMERGENCY)
Facility: HOSPITAL | Age: 53
Discharge: HOME | End: 2023-09-19
Attending: EMERGENCY MEDICINE
Payer: COMMERCIAL

## 2023-09-19 ENCOUNTER — APPOINTMENT (EMERGENCY)
Dept: RADIOLOGY | Facility: HOSPITAL | Age: 53
End: 2023-09-19
Payer: COMMERCIAL

## 2023-09-19 VITALS
RESPIRATION RATE: 17 BRPM | SYSTOLIC BLOOD PRESSURE: 120 MMHG | OXYGEN SATURATION: 98 % | DIASTOLIC BLOOD PRESSURE: 84 MMHG | HEART RATE: 94 BPM | TEMPERATURE: 97.8 F

## 2023-09-19 DIAGNOSIS — L08.9 WOUND INFECTION: ICD-10-CM

## 2023-09-19 DIAGNOSIS — L03.115 CELLULITIS OF RIGHT LOWER EXTREMITY: Primary | ICD-10-CM

## 2023-09-19 DIAGNOSIS — R73.9 HYPERGLYCEMIA: ICD-10-CM

## 2023-09-19 DIAGNOSIS — E87.1 HYPONATREMIA: ICD-10-CM

## 2023-09-19 DIAGNOSIS — T14.8XXA WOUND INFECTION: ICD-10-CM

## 2023-09-19 LAB
ALBUMIN SERPL BCP-MCNC: 4.3 G/DL (ref 3.5–5)
ALP SERPL-CCNC: 167 U/L (ref 34–104)
ALT SERPL W P-5'-P-CCNC: 20 U/L (ref 7–52)
ANION GAP SERPL CALCULATED.3IONS-SCNC: 12 MMOL/L
APTT PPP: 26 SECONDS (ref 23–37)
AST SERPL W P-5'-P-CCNC: 22 U/L (ref 13–39)
ATRIAL RATE: 97 BPM
BASOPHILS # BLD AUTO: 0.06 THOUSANDS/ÂΜL (ref 0–0.1)
BASOPHILS NFR BLD AUTO: 1 % (ref 0–1)
BILIRUB SERPL-MCNC: 0.34 MG/DL (ref 0.2–1)
BUN SERPL-MCNC: 13 MG/DL (ref 5–25)
CALCIUM SERPL-MCNC: 9.3 MG/DL (ref 8.4–10.2)
CHLORIDE SERPL-SCNC: 95 MMOL/L (ref 96–108)
CO2 SERPL-SCNC: 22 MMOL/L (ref 21–32)
CREAT SERPL-MCNC: 1 MG/DL (ref 0.6–1.3)
EOSINOPHIL # BLD AUTO: 0.25 THOUSAND/ÂΜL (ref 0–0.61)
EOSINOPHIL NFR BLD AUTO: 4 % (ref 0–6)
ERYTHROCYTE [DISTWIDTH] IN BLOOD BY AUTOMATED COUNT: 10.9 % (ref 11.6–15.1)
FLUAV RNA RESP QL NAA+PROBE: NEGATIVE
FLUBV RNA RESP QL NAA+PROBE: NEGATIVE
GFR SERPL CREATININE-BSD FRML MDRD: 86 ML/MIN/1.73SQ M
GLUCOSE SERPL-MCNC: 154 MG/DL (ref 65–140)
GLUCOSE SERPL-MCNC: 199 MG/DL (ref 65–140)
HCT VFR BLD AUTO: 39.5 % (ref 36.5–49.3)
HGB BLD-MCNC: 13.6 G/DL (ref 12–17)
IMM GRANULOCYTES # BLD AUTO: 0.01 THOUSAND/UL (ref 0–0.2)
IMM GRANULOCYTES NFR BLD AUTO: 0 % (ref 0–2)
INR PPP: 0.97 (ref 0.84–1.19)
LACTATE SERPL-SCNC: 1.7 MMOL/L (ref 0.5–2)
LYMPHOCYTES # BLD AUTO: 1.9 THOUSANDS/ÂΜL (ref 0.6–4.47)
LYMPHOCYTES NFR BLD AUTO: 28 % (ref 14–44)
MCH RBC QN AUTO: 31.9 PG (ref 26.8–34.3)
MCHC RBC AUTO-ENTMCNC: 34.4 G/DL (ref 31.4–37.4)
MCV RBC AUTO: 93 FL (ref 82–98)
MONOCYTES # BLD AUTO: 0.6 THOUSAND/ÂΜL (ref 0.17–1.22)
MONOCYTES NFR BLD AUTO: 9 % (ref 4–12)
NEUTROPHILS # BLD AUTO: 3.89 THOUSANDS/ÂΜL (ref 1.85–7.62)
NEUTS SEG NFR BLD AUTO: 58 % (ref 43–75)
NRBC BLD AUTO-RTO: 0 /100 WBCS
P AXIS: 62 DEGREES
PLATELET # BLD AUTO: 388 THOUSANDS/UL (ref 149–390)
PMV BLD AUTO: 8.6 FL (ref 8.9–12.7)
POTASSIUM SERPL-SCNC: 3.9 MMOL/L (ref 3.5–5.3)
PR INTERVAL: 140 MS
PROCALCITONIN SERPL-MCNC: 0.07 NG/ML
PROT SERPL-MCNC: 7.4 G/DL (ref 6.4–8.4)
PROTHROMBIN TIME: 13.5 SECONDS (ref 11.6–14.5)
QRS AXIS: 38 DEGREES
QRSD INTERVAL: 84 MS
QT INTERVAL: 342 MS
QTC INTERVAL: 434 MS
RBC # BLD AUTO: 4.26 MILLION/UL (ref 3.88–5.62)
RSV RNA RESP QL NAA+PROBE: NEGATIVE
SARS-COV-2 RNA RESP QL NAA+PROBE: NEGATIVE
SODIUM SERPL-SCNC: 129 MMOL/L (ref 135–147)
T WAVE AXIS: 42 DEGREES
VENTRICULAR RATE: 97 BPM
WBC # BLD AUTO: 6.71 THOUSAND/UL (ref 4.31–10.16)

## 2023-09-19 PROCEDURE — 93010 ELECTROCARDIOGRAM REPORT: CPT | Performed by: INTERNAL MEDICINE

## 2023-09-19 PROCEDURE — 96361 HYDRATE IV INFUSION ADD-ON: CPT

## 2023-09-19 PROCEDURE — 83605 ASSAY OF LACTIC ACID: CPT | Performed by: EMERGENCY MEDICINE

## 2023-09-19 PROCEDURE — 85730 THROMBOPLASTIN TIME PARTIAL: CPT | Performed by: EMERGENCY MEDICINE

## 2023-09-19 PROCEDURE — 85025 COMPLETE CBC W/AUTO DIFF WBC: CPT | Performed by: EMERGENCY MEDICINE

## 2023-09-19 PROCEDURE — 73590 X-RAY EXAM OF LOWER LEG: CPT

## 2023-09-19 PROCEDURE — 93005 ELECTROCARDIOGRAM TRACING: CPT

## 2023-09-19 PROCEDURE — 85610 PROTHROMBIN TIME: CPT | Performed by: EMERGENCY MEDICINE

## 2023-09-19 PROCEDURE — 99285 EMERGENCY DEPT VISIT HI MDM: CPT | Performed by: EMERGENCY MEDICINE

## 2023-09-19 PROCEDURE — 82948 REAGENT STRIP/BLOOD GLUCOSE: CPT

## 2023-09-19 PROCEDURE — 80053 COMPREHEN METABOLIC PANEL: CPT | Performed by: EMERGENCY MEDICINE

## 2023-09-19 PROCEDURE — 36415 COLL VENOUS BLD VENIPUNCTURE: CPT | Performed by: EMERGENCY MEDICINE

## 2023-09-19 PROCEDURE — 96374 THER/PROPH/DIAG INJ IV PUSH: CPT

## 2023-09-19 PROCEDURE — 99283 EMERGENCY DEPT VISIT LOW MDM: CPT

## 2023-09-19 PROCEDURE — 0241U HB NFCT DS VIR RESP RNA 4 TRGT: CPT | Performed by: EMERGENCY MEDICINE

## 2023-09-19 PROCEDURE — 71045 X-RAY EXAM CHEST 1 VIEW: CPT

## 2023-09-19 PROCEDURE — 84145 PROCALCITONIN (PCT): CPT | Performed by: EMERGENCY MEDICINE

## 2023-09-19 PROCEDURE — 87040 BLOOD CULTURE FOR BACTERIA: CPT | Performed by: EMERGENCY MEDICINE

## 2023-09-19 RX ORDER — CLINDAMYCIN HYDROCHLORIDE 150 MG/1
300 CAPSULE ORAL ONCE
Status: COMPLETED | OUTPATIENT
Start: 2023-09-19 | End: 2023-09-19

## 2023-09-19 RX ORDER — CLINDAMYCIN HYDROCHLORIDE 300 MG/1
300 CAPSULE ORAL 4 TIMES DAILY
Qty: 28 CAPSULE | Refills: 0 | Status: SHIPPED | OUTPATIENT
Start: 2023-09-19 | End: 2023-09-26

## 2023-09-19 RX ORDER — HYDROXYZINE HYDROCHLORIDE 25 MG/1
25 TABLET, FILM COATED ORAL EVERY 6 HOURS PRN
Qty: 12 TABLET | Refills: 0 | Status: SHIPPED | OUTPATIENT
Start: 2023-09-19

## 2023-09-19 RX ORDER — SODIUM CHLORIDE 9 MG/ML
3 INJECTION INTRAVENOUS EVERY 12 HOURS SCHEDULED
Status: DISCONTINUED | OUTPATIENT
Start: 2023-09-19 | End: 2023-09-19 | Stop reason: HOSPADM

## 2023-09-19 RX ADMIN — SODIUM CHLORIDE 1000 ML: 0.9 INJECTION, SOLUTION INTRAVENOUS at 19:54

## 2023-09-19 RX ADMIN — SODIUM CHLORIDE 3 ML: 9 INJECTION, SOLUTION INTRAVENOUS at 20:04

## 2023-09-19 RX ADMIN — CLINDAMYCIN HYDROCHLORIDE 300 MG: 150 CAPSULE ORAL at 20:58

## 2023-09-19 NOTE — ED PROVIDER NOTES
Pt Name: Kaykay Herrera  MRN: [id number]  [address] 1970  Age/Sex: 48 y.o. male  Date of evaluation: 9/19/2023  PCP: Jose M Ayers MD    [address]    Chief Complaint   Patient presents with   • Wound Check     Pt reports he cut his right shin 2 weeks ago, wound is not healing (pt is diabetic), redness to the leg. Pt on abx currently and wound looks worse to him         HPI    48 y.o. male presenting with a wound to his right shin. Patient states that he cut his right shin on a large clamshell in his garden about 2 weeks ago. He states that he did not seek care, the wound seem to be healing began having redness to the area a few days later. Patient was started on Bactrim and Keflex on the 14th of this month, has been taking this medication but states that the redness seems to be getting slightly worse over the past day. Despite those medications. He complains of itchiness to the area as well as mild pain which is dull, nonradiating, worse with pressing the area and better at rest.  Patient still able to walk. He denies fevers, nausea, vomiting, diarrhea, trauma, elevated blood sugars, other symptoms.       HPI      Past Medical and Surgical History    Past Medical History:   Diagnosis Date   • Diabetes mellitus ([address])    • GERD (gastroesophageal reflux disease)    • Hyperlipidemia    • Hypertension        Past Surgical History:   Procedure Laterality Date   • HAND NERVE REPAIR Right    • LEG SURGERY         Family History   Problem Relation Age of Onset   • No Known Problems Mother    • No Known Problems Father    • No Known Problems Sister    • No Known Problems Brother    • No Known Problems Maternal Grandmother    • No Known Problems Maternal Grandfather    • No Known Problems Paternal Grandmother    • No Known Problems Paternal Grandfather    • No Known Problems Maternal Aunt    • No Known Problems Maternal Uncle    • No Known Problems Paternal Aunt    • No Known Problems Paternal Uncle    • No Known Problems Cousin        Social History     Tobacco Use   • Smoking status: Every Day     Packs/day: 1.00     Types: Cigarettes   • Smokeless tobacco: Never   Vaping Use   • Vaping Use: Never used   Substance Use Topics   • Alcohol use: Yes     Alcohol/week: 2.0 standard drinks of alcohol     Types: 2 Standard drinks or equivalent per week   • Drug use: Yes     Frequency: 7.0 times per week     Types: Marijuana           Allergies    Allergies   Allergen Reactions   • Pollen Extract Sneezing       Home Medications    Prior to Admission medications    Medication Sig Start Date End Date Taking? Authorizing Provider   amphetamine-dextroamphetamine (ADDERALL XR) 20 MG 24 hr capsule Take 20 mg by mouth every morning 2/22/23   Historical Provider, MD   Apidrdwaine SoloStar 100 units/mL injection pen TO BE USED VIA INSULIN PUMP.  UP  UNITS/DAY, NORMAL 12/17/22   Historical Provider, MD   atorvastatin (LIPITOR) 40 mg tablet Take 40 mg by mouth daily 3/11/20 3/11/21  Historical Provider, MD   buPROPion (WELLBUTRIN XL) 300 mg 24 hr tablet Take 300 mg by mouth every morning 12/9/22   Historical Provider, MD   cephalexin (Keflex) 500 mg capsule Take 1 capsule (500 mg total) by mouth 4 (four) times a day for 7 days 9/14/23 9/21/23  Luis Fernando Delgado PA-C   DULoxetine (CYMBALTA) 30 mg delayed release capsule Take 30 mg by mouth daily 2/21/23 6/21/23  Historical Provider, MD   Elastic Bandages & Supports (Medical Compression Stockings) MISC Use daily Knee High 20-30mmHg 12/7/20   Paul Johansen MD   Elastic Bandages & Supports (Medical Compression Thigh High) MISC Use daily 20-30mmHg 12/7/20   Paul Johansen MD   fexofenadine (ALLEGRA) 60 MG tablet Take 60 mg by mouth daily 12/5/22   Historical Provider, MD   folic acid (FOLVITE) 270 MCG tablet Take 400 mcg by mouth daily 5/26/20 5/26/21  Historical Provider, MD   gabapentin (NEURONTIN) 300 mg capsule Take 300 mg by mouth Three times a day 2/21/20 2/20/21 Historical Provider, MD   hydrOXYzine HCL (ATARAX) 50 mg tablet Take 50 mg by mouth 2 (two) times a day as needed 12/9/22   Historical Provider, MD   ibuprofen (MOTRIN) 800 mg tablet TAKE 1 TABLET (800 MG TOTAL) BY MOUTH DAILY AS NEEDED FOR MODERATE PAIN (PAIN SCORE 4-6). 12/5/22   Historical Provider, MD   insulin aspart (NovoLOG FlexPen) 100 UNIT/ML injection pen INJECT 30 UNITS UNDER THE SKIN 3 (THREE) TIMES A DAY BEFORE MEALS. PT IS ON SLIDING SCALE 8/18/20   Historical Provider, MD   insulin glulisine (Apidra SoloStar) 100 units/mL injection pen To be used via insulin pump. Up to 100 units/day, Normal 11/1/22   Historical Provider, MD   Lantus SoloStar 100 units/mL injection pen 30 Units daily 11/22/20   Historical Provider, MD   lisinopril (ZESTRIL) 5 mg tablet Take 5 mg by mouth daily 12/5/22   Historical Provider, MD   Methylphenidate HCl ER 18 MG TB24 Take 18 mg by mouth daily 11/11/22   Historical Provider, MD   pantoprazole (PROTONIX) 40 mg tablet Take 40 mg by mouth daily 12/5/22   Historical Provider, MD   pregabalin (LYRICA) 25 mg capsule Take 1 capsule by mouth 2 (two) times a day 8/29/22   Historical Provider, MD   sulfamethoxazole-trimethoprim (BACTRIM DS) 800-160 mg per tablet Take 1 tablet by mouth 2 (two) times a day for 7 days 9/14/23 9/21/23  Ariel Dash PA-C   vitamin B-12 (VITAMIN B-12) 500 mcg tablet Take 500 mcg by mouth daily 9/1/20   Historical Provider, MD   zolpidem (AMBIEN) 10 mg tablet Take 10 mg by mouth daily at bedtime as needed 12/9/22   Historical Provider, MD   ciprofloxacin-dexamethasone (CIPRODEX) otic suspension Administer 4 drops to the right ear 2 (two) times a day 12/2/20 12/18/20  Kerry Him, DO           Review of Systems    Review of Systems   Constitutional: Negative for appetite change, chills and diaphoresis. HENT: Negative for drooling, facial swelling, trouble swallowing and voice change.     Respiratory: Negative for apnea, shortness of breath and wheezing. Cardiovascular: Negative for chest pain and leg swelling. Gastrointestinal: Negative for abdominal distention, abdominal pain, diarrhea, nausea and vomiting. Genitourinary: Negative for dysuria and urgency. Musculoskeletal: Negative for arthralgias, back pain, gait problem and neck pain. Skin: Positive for rash and wound. Negative for color change. Neurological: Negative for seizures, speech difficulty, weakness and headaches. Psychiatric/Behavioral: Negative for agitation, behavioral problems and dysphoric mood. The patient is not nervous/anxious. All other systems reviewed and negative. Physical Exam      ED Triage Vitals [09/19/23 1828]   Temperature Pulse Respirations Blood Pressure SpO2   97.8 °F (36.6 °C) 94 17 120/84 98 %      Temp Source Heart Rate Source Patient Position - Orthostatic VS BP Location FiO2 (%)   Temporal Monitor Sitting Left arm --      Pain Score       --               Physical Exam  Vitals and nursing note reviewed. Constitutional:       General: He is not in acute distress. Appearance: He is well-developed. He is not ill-appearing, toxic-appearing or diaphoretic. HENT:      Head: Normocephalic and atraumatic. Right Ear: External ear normal.      Left Ear: External ear normal.      Nose: Nose normal. No congestion or rhinorrhea. Mouth/Throat:      Mouth: Mucous membranes are moist.      Pharynx: Oropharynx is clear. No oropharyngeal exudate or posterior oropharyngeal erythema. Eyes:      Conjunctiva/sclera: Conjunctivae normal.      Pupils: Pupils are equal, round, and reactive to light. Neck:      Trachea: No tracheal deviation. Cardiovascular:      Rate and Rhythm: Normal rate and regular rhythm. Heart sounds: Normal heart sounds. No murmur heard. Pulmonary:      Effort: Pulmonary effort is normal. No respiratory distress. Breath sounds: Normal breath sounds. No stridor. No wheezing or rales.    Abdominal: General: There is no distension. Palpations: Abdomen is soft. Tenderness: There is no abdominal tenderness. There is no guarding or rebound. Musculoskeletal:         General: Swelling and tenderness present. No deformity. Normal range of motion. Cervical back: Normal range of motion and neck supple. Comments: Swelling tenderness noted the right lower extremity. Approximately 4 cm round patch on the anterior right lower leg, with 1 cm vertically oriented wound in the center mildly tender to palpation but no pain out of proportion, no fluctuance or clear target for drainage at this time. Wound edges well demarcated without streaking. Strength and station pulse and cap refill intact distal.   Skin:     General: Skin is warm and dry. Capillary Refill: Capillary refill takes less than 2 seconds. Findings: Rash present. Neurological:      Mental Status: He is alert and oriented to person, place, and time. Psychiatric:         Behavior: Behavior normal.         Thought Content: Thought content normal.         Judgment: Judgment normal.       See ED reevaluation note for photograph of wound. Diagnostic Results    EKG Interpretation    Rate:  97  BPM  Rhythm:  Normal Sinus Rhythm   Axis:  Normal   Intervals: Normal, no blocks, QTc  434 ms  Q waves:  No pathologic Q waves   T waves:  Normal   ST segments:  No significant elevations or depressions     Impression:  Normal sinus rhythm without evidence of acute ischemia or significant arrhythmia      EKG for comparison: None available    EKG interpreted by me.      Labs:    Results Reviewed     Procedure Component Value Units Date/Time    FLU/RSV/COVID - if FLU/RSV clinically relevant [[id number]]  (Normal) Collected: 09/19/23 1850    Lab Status: Final result Specimen: Nares from Nose Updated: 09/19/23 1949     SARS-CoV-2 Negative     INFLUENZA A PCR Negative     INFLUENZA B PCR Negative     RSV PCR Negative    Narrative:      FOR PEDIATRIC PATIENTS - copy/paste COVID Guidelines URL to browser: [email or web address]    SARS-CoV-2 assay is a Nucleic Acid Amplification assay intended for the  qualitative detection of nucleic acid from SARS-CoV-2 in nasopharyngeal  swabs. Results are for the presumptive identification of SARS-CoV-2 RNA. Positive results are indicative of infection with SARS-CoV-2, the virus  causing COVID-19, but do not rule out bacterial infection or co-infection  with other viruses. Laboratories within the [facility] and its  territories are required to report all positive results to the appropriate  public health authorities. Negative results do not preclude SARS-CoV-2  infection and should not be used as the sole basis for treatment or other  patient management decisions. Negative results must be combined with  clinical observations, patient history, and epidemiological information. This test has not been FDA cleared or approved. This test has been authorized by FDA under an Emergency Use Authorization  (EUA). This test is only authorized for the duration of time the  declaration that circumstances exist justifying the authorization of the  emergency use of an in vitro diagnostic tests for detection of SARS-CoV-2  virus and/or diagnosis of COVID-19 infection under section 564(b)(1) of  the Act, 21 U. S.C. 541OQW-0(M)(5), unless the authorization is terminated  or revoked sooner. The test has been validated but independent review by FDA  and CLIA is pending. Test performed using [employer] GeneXpert: This RT-PCR assay targets N2,  a region unique to SARS-CoV-2. A conserved region in the E-gene was chosen  for pan-Sarbecovirus detection which includes SARS-CoV-2. According to CMS-2020-01-R, this platform meets the definition of high-throughput technology.     Lactic acid [[id number]]  (Normal) Collected: 09/19/23 1919    Lab Status: Final result Specimen: Blood from Arm, Left Updated: 09/19/23 1946     LACTIC ACID 1.7 mmol/L     Narrative:      Result may be elevated if tourniquet was used during collection. Procalcitonin [[id number]]  (Normal) Collected: 09/19/23 1850    Lab Status: Final result Specimen: Blood from Arm, Right Updated: 09/19/23 1938     Procalcitonin 0.07 ng/ml     Comprehensive metabolic panel [[id number]]  (Abnormal) Collected: 09/19/23 1850    Lab Status: Final result Specimen: Blood from Arm, Right Updated: 09/19/23 1929     Sodium 129 mmol/L      Potassium 3.9 mmol/L      Chloride 95 mmol/L      CO2 22 mmol/L      ANION GAP 12 mmol/L      BUN 13 mg/dL      Creatinine 1.00 mg/dL      Glucose 199 mg/dL      Calcium 9.3 mg/dL      AST 22 U/L      ALT 20 U/L      Alkaline Phosphatase 167 U/L      Total Protein 7.4 g/dL      Albumin 4.3 g/dL      Total Bilirubin 0.34 mg/dL      eGFR 86 ml/min/1.73sq m     Narrative:      [place] guidelines for Chronic Kidney Disease (CKD):   •  Stage 1 with normal or high GFR (GFR > 90 mL/min/1.73 square meters)  •  Stage 2 Mild CKD (GFR = 60-89 mL/min/1.73 square meters)  •  Stage 3A Moderate CKD (GFR = 45-59 mL/min/1.73 square meters)  •  Stage 3B Moderate CKD (GFR = 30-44 mL/min/1.73 square meters)  •  Stage 4 Severe CKD (GFR = 15-29 mL/min/1.73 square meters)  •  Stage 5 End Stage CKD (GFR <15 mL/min/1.73 square meters)  Note: GFR calculation is accurate only with a steady state creatinine    Blood culture #2 [[id number]] Collected: 09/19/23 1919    Lab Status:  In process Specimen: Blood from Arm, Left Updated: 09/19/23 [place] [[id number]]  (Normal) Collected: 09/19/23 1850    Lab Status: Final result Specimen: Blood from Arm, Right Updated: 09/19/23 1920     Protime 13.5 seconds      INR 0.97    APTT [[id number]]  (Normal) Collected: 09/19/23 1850    Lab Status: Final result Specimen: Blood from Arm, Right Updated: 09/19/23 1920     PTT 26 seconds     Fingerstick Glucose (POCT) [[id number]]  (Abnormal) Collected: 09/19/23 1914    Lab Status: Final result Updated: 09/19/23 1918     POC Glucose 154 mg/dl     CBC and differential [[id number]]  (Abnormal) Collected: 09/19/23 1850    Lab Status: Final result Specimen: Blood from Arm, Right Updated: 09/19/23 1905     WBC 6.71 Thousand/uL      RBC 4.26 Million/uL      Hemoglobin 13.6 g/dL      Hematocrit 39.5 %      MCV 93 fL      MCH 31.9 pg      MCHC 34.4 g/dL      RDW 10.9 %      MPV 8.6 fL      Platelets 493 Thousands/uL      nRBC 0 /100 WBCs      Neutrophils Relative 58 %      Immat GRANS % 0 %      Lymphocytes Relative 28 %      Monocytes Relative 9 %      Eosinophils Relative 4 %      Basophils Relative 1 %      Neutrophils Absolute 3.89 Thousands/µL      Immature Grans Absolute 0.01 Thousand/uL      Lymphocytes Absolute 1.90 Thousands/µL      Monocytes Absolute 0.60 Thousand/µL      Eosinophils Absolute 0.25 Thousand/µL      Basophils Absolute 0.06 Thousands/µL     Blood culture #1 [[id number]] Collected: 09/19/23 1850    Lab Status: In process Specimen: Blood from Arm, Right Updated: 09/19/23 1902          All labs reviewed and utilized in the medical decision making process    Radiology:    XR chest portable   ED Interpretation   No acute cardiopulmonary process or osseous abnormality. XR tibia fibula 2 views RIGHT   ED Interpretation   No acute fracture or dislocation. All radiology studies independently viewed by me and interpreted by the radiologist.    Procedure    Procedures        ED Course of Care and Re-Assessments      Patient noted to have elevated blood sugar as well as mild hyponatremia, given IV fluids for resuscitation and correction of hyponatremia. After labs returned, discussed options for treatment at this time.   Patient offered admission, but after discussion of risk and benefits strongly preferred discharge home with different antibiotic, which seems reasonable in the setting of no systemic symptoms and overall reassuring clinical picture. Switch to clindamycin. Medications   sodium chloride 0.9 % bolus 1,000 mL (0 mL Intravenous Stopped 9/19/23 2109)   clindamycin (CLEOCIN) capsule 300 mg (300 mg Oral Given 9/19/23 2058)           FINAL IMPRESSION    Final diagnoses:   Cellulitis of right lower extremity   Wound infection   Hyponatremia   Hyperglycemia         DISPOSITION/PLAN    Presentation as above felt was consistent with wound infection a localized cellulitis of the right lower extremity. Vital signs reassuring, examination likewise reassuring overall. Low suspicion for abscess, osteomyelitis, necrotizing soft tissue infection, sepsis, compartment syndrome, other acute threat to life or limb. Overall seems most consistent with localized wound infection, no fluid collection or other target for bedside drainage noted at time of ER visit. Based on very well demarcated area, we also discussed the possibility the patient is developing an allergy or sensitivity to wound care products or dressing that he is currently using, counseled to cleanse the wound and to use alternate dressing. Switch to clindamycin, discharged with strict return precautions, follow-up primary care doctor. Time reflects when diagnosis was documented in both MDM as applicable and the Disposition within this note     Time User Action Codes Description Comment    9/19/2023  8:48 PM Montenegro Skillman Add [L03.115] Cellulitis of right lower extremity     9/19/2023  8:48 PM Montenegro Skillman Add [T14. 8XXA,  L08.9] Wound infection     9/19/2023 11:23 PM Char Bulmaro T Add [E87.1] Hyponatremia     9/19/2023 11:23 PM Char Chamberlain T Add [R73.9] Hyperglycemia       ED Disposition     ED Disposition   Discharge    Condition   Stable    Date/Time   Tue Sep 19, 2023  8:48 PM    Comment   Juliana Almaraz discharge to home/self care.                Follow-up Information     Follow up With Specialties Details Why Contact Info [facility] Department Emergency Medicine Go to  If symptoms worsen [address] [facility] [address] [address] [facility] Emergency Department, [place], [place], [address]    Shayne Marks MD  Go in 2 days For wound re-check [address] [facility] [address] [facility]  [phone]               PATIENT REFERRED TO:    [address] [place] [address] Emergency Department  [address] [facility] [address] [phone]  Go to   If symptoms worsen    Shayne Marks MD  [address] [facility] [address] [facility]  [phone]    Go in 2 days  For wound re-check      DISCHARGE MEDICATIONS:    Discharge Medication List as of 9/19/2023  8:49 PM      START taking these medications    Details   clindamycin (CLEOCIN) 300 MG capsule Take 1 capsule (300 mg total) by mouth 4 (four) times a day for 7 days, Starting Tue 9/19/2023, Until Tue 9/26/2023, Print      !! hydrOXYzine HCL (ATARAX) 25 mg tablet Take 1 tablet (25 mg total) by mouth every 6 (six) hours as needed for itching, Starting Tue 9/19/2023, Print       !! - Potential duplicate medications found. Please discuss with provider. CONTINUE these medications which have NOT CHANGED    Details   amphetamine-dextroamphetamine (ADDERALL XR) 20 MG 24 hr capsule Take 20 mg by mouth every morning, Starting Wed 2/22/2023, Historical Med      !! Apidra SoloStar 100 units/mL injection pen TO BE USED VIA INSULIN PUMP.  UP  UNITS/DAY, NORMAL, Historical Med      atorvastatin (LIPITOR) 40 mg tablet Take 40 mg by mouth daily, Starting Wed 3/11/2020, Until Thu 3/11/2021, Historical Med      buPROPion (WELLBUTRIN XL) 300 mg 24 hr tablet Take 300 mg by mouth every morning, Starting Fri 12/9/2022, Historical Med      cephalexin (Keflex) 500 mg capsule Take 1 capsule (500 mg total) by mouth 4 (four) times a day for 7 days, Starting Thu 9/14/2023, Until Thu 9/21/2023, Print      DULoxetine (CYMBALTA) 30 mg delayed release capsule Take 30 mg by mouth daily, Starting Tue 2/21/2023, Until Wed 6/21/2023, Historical Med      !! Elastic Bandages & Supports (Medical Compression Stockings) MISC Use daily Knee High 20-30mmHg, Starting Mon 12/7/2020, Print      !! Elastic Bandages & Supports (Medical Compression Thigh High) MISC Use daily 20-30mmHg, Starting Mon 12/7/2020, Print      fexofenadine (ALLEGRA) 60 MG tablet Take 60 mg by mouth daily, Starting Mon 12/5/2022, Historical Med      folic acid (FOLVITE) 429 MCG tablet Take 400 mcg by mouth daily, Starting Tue 5/26/2020, Until Wed 5/26/2021, Historical Med      gabapentin (NEURONTIN) 300 mg capsule Take 300 mg by mouth Three times a day, Starting Fri 2/21/2020, Until Sat 2/20/2021, Historical Med      !! hydrOXYzine HCL (ATARAX) 50 mg tablet Take 50 mg by mouth 2 (two) times a day as needed, Starting Fri 12/9/2022, Historical Med      ibuprofen (MOTRIN) 800 mg tablet TAKE 1 TABLET (800 MG TOTAL) BY MOUTH DAILY AS NEEDED FOR MODERATE PAIN (PAIN SCORE 4-6). , Historical Med      insulin aspart (NovoLOG FlexPen) 100 UNIT/ML injection pen INJECT 30 UNITS UNDER THE SKIN 3 (THREE) TIMES A DAY BEFORE MEALS. PT IS ON SLIDING SCALE, Historical Med      !! insulin glulisine (Apidra SoloStar) 100 units/mL injection pen To be used via insulin pump.  Up to 100 units/day, Normal, Historical Med      Lantus SoloStar 100 units/mL injection pen 30 Units daily, Starting Sun 11/22/2020, Historical Med      lisinopril (ZESTRIL) 5 mg tablet Take 5 mg by mouth daily, Starting Mon 12/5/2022, Historical Med      Methylphenidate HCl ER 18 MG TB24 Take 18 mg by mouth daily, Starting Fri 11/11/2022, Historical Med      pantoprazole (PROTONIX) 40 mg tablet Take 40 mg by mouth daily, Starting Mon 12/5/2022, Historical Med      pregabalin (LYRICA) 25 mg capsule Take 1 capsule by mouth 2 (two) times a day, Starting Mon 8/29/2022, Historical Med      sulfamethoxazole-trimethoprim (BACTRIM DS) 800-160 mg per tablet Take 1 tablet by mouth 2 (two) times a day for 7 days, Starting Thu 9/14/2023, Until Thu 9/21/2023, Print      vitamin B-12 (VITAMIN B-12) 500 mcg tablet Take 500 mcg by mouth daily, Starting Tue 9/1/2020, Historical Med      zolpidem (AMBIEN) 10 mg tablet Take 10 mg by mouth daily at bedtime as needed, Starting Fri 12/9/2022, Historical Med       !! - Potential duplicate medications found. Please discuss with provider. No discharge procedures on file. Ulysses Dole, MD    Portions of the record may have been created with voice recognition software. Occasional wrong word or "sound alike" substitutions may have occurred due to the inherent limitations of voice recognition software.   Please read the chart carefully and recognize, using context, where substitutions have occurred     Ulysses Dole, MD  09/19/23 2737

## 2023-09-24 LAB
BACTERIA BLD CULT: NORMAL
BACTERIA BLD CULT: NORMAL

## 2024-08-24 ENCOUNTER — HOSPITAL ENCOUNTER (INPATIENT)
Facility: HOSPITAL | Age: 54
LOS: 3 days | Discharge: HOME | End: 2024-08-27
Attending: EMERGENCY MEDICINE | Admitting: EMERGENCY MEDICINE
Payer: COMMERCIAL

## 2024-08-24 DIAGNOSIS — F17.200 TOBACCO USE DISORDER: ICD-10-CM

## 2024-08-24 DIAGNOSIS — F14.10 COCAINE USE DISORDER (HCC): ICD-10-CM

## 2024-08-24 DIAGNOSIS — F10.939 ALCOHOL WITHDRAWAL SYNDROME WITH COMPLICATION (HCC): ICD-10-CM

## 2024-08-24 DIAGNOSIS — F90.9 ADHD: ICD-10-CM

## 2024-08-24 DIAGNOSIS — F10.20 ALCOHOL USE DISORDER, SEVERE, DEPENDENCE (HCC): ICD-10-CM

## 2024-08-24 DIAGNOSIS — E11.65 HYPERGLYCEMIA DUE TO DIABETES MELLITUS (HCC): ICD-10-CM

## 2024-08-24 DIAGNOSIS — F10.90 ALCOHOL USE DISORDER: Primary | ICD-10-CM

## 2024-08-24 PROBLEM — F41.9 ANXIETY: Status: ACTIVE | Noted: 2024-08-24

## 2024-08-24 PROBLEM — I10 HTN (HYPERTENSION): Status: ACTIVE | Noted: 2024-08-24

## 2024-08-24 PROBLEM — E10.9 DM I (DIABETES MELLITUS, TYPE I) (HCC): Status: ACTIVE | Noted: 2024-08-24

## 2024-08-24 PROBLEM — E78.5 HYPERLIPIDEMIA: Status: ACTIVE | Noted: 2024-08-24

## 2024-08-24 LAB
ALBUMIN SERPL BCG-MCNC: 4.7 G/DL (ref 3.5–5)
ALP SERPL-CCNC: 134 U/L (ref 34–104)
ALT SERPL W P-5'-P-CCNC: 25 U/L (ref 7–52)
AMPHETAMINES SERPL QL SCN: NEGATIVE
ANION GAP SERPL CALCULATED.3IONS-SCNC: 9 MMOL/L (ref 4–13)
AST SERPL W P-5'-P-CCNC: 23 U/L (ref 13–39)
ATRIAL RATE: 83 BPM
BARBITURATES UR QL: NEGATIVE
BASOPHILS # BLD AUTO: 0.05 THOUSANDS/ÂΜL (ref 0–0.1)
BASOPHILS NFR BLD AUTO: 1 % (ref 0–1)
BENZODIAZ UR QL: NEGATIVE
BILIRUB SERPL-MCNC: 0.34 MG/DL (ref 0.2–1)
BUN SERPL-MCNC: 18 MG/DL (ref 5–25)
CALCIUM SERPL-MCNC: 10.1 MG/DL (ref 8.4–10.2)
CHLORIDE SERPL-SCNC: 101 MMOL/L (ref 96–108)
CO2 SERPL-SCNC: 29 MMOL/L (ref 21–32)
COCAINE UR QL: POSITIVE
CREAT SERPL-MCNC: 1 MG/DL (ref 0.6–1.3)
EOSINOPHIL # BLD AUTO: 0.5 THOUSAND/ÂΜL (ref 0–0.61)
EOSINOPHIL NFR BLD AUTO: 6 % (ref 0–6)
ERYTHROCYTE [DISTWIDTH] IN BLOOD BY AUTOMATED COUNT: 11.6 % (ref 11.6–15.1)
ETHANOL SERPL-MCNC: <10 MG/DL
FENTANYL UR QL SCN: NEGATIVE
GFR SERPL CREATININE-BSD FRML MDRD: 86 ML/MIN/1.73SQ M
GLUCOSE SERPL-MCNC: 126 MG/DL (ref 65–140)
GLUCOSE SERPL-MCNC: 278 MG/DL (ref 65–140)
GLUCOSE SERPL-MCNC: 517 MG/DL (ref 65–140)
GLUCOSE SERPL-MCNC: 77 MG/DL (ref 65–140)
HCT VFR BLD AUTO: 40.2 % (ref 36.5–49.3)
HGB BLD-MCNC: 13.1 G/DL (ref 12–17)
HYDROCODONE UR QL SCN: NEGATIVE
IMM GRANULOCYTES # BLD AUTO: 0.01 THOUSAND/UL (ref 0–0.2)
IMM GRANULOCYTES NFR BLD AUTO: 0 % (ref 0–2)
LYMPHOCYTES # BLD AUTO: 2.96 THOUSANDS/ÂΜL (ref 0.6–4.47)
LYMPHOCYTES NFR BLD AUTO: 38 % (ref 14–44)
MAGNESIUM SERPL-MCNC: 1.9 MG/DL (ref 1.9–2.7)
MCH RBC QN AUTO: 32.7 PG (ref 26.8–34.3)
MCHC RBC AUTO-ENTMCNC: 32.6 G/DL (ref 31.4–37.4)
MCV RBC AUTO: 100 FL (ref 82–98)
METHADONE UR QL: NEGATIVE
MONOCYTES # BLD AUTO: 0.73 THOUSAND/ÂΜL (ref 0.17–1.22)
MONOCYTES NFR BLD AUTO: 9 % (ref 4–12)
NEUTROPHILS # BLD AUTO: 3.59 THOUSANDS/ÂΜL (ref 1.85–7.62)
NEUTS SEG NFR BLD AUTO: 46 % (ref 43–75)
NRBC BLD AUTO-RTO: 0 /100 WBCS
OPIATES UR QL SCN: NEGATIVE
OXYCODONE+OXYMORPHONE UR QL SCN: NEGATIVE
P AXIS: 71 DEGREES
PCP UR QL: NEGATIVE
PLATELET # BLD AUTO: 331 THOUSANDS/UL (ref 149–390)
PMV BLD AUTO: 8.9 FL (ref 8.9–12.7)
POTASSIUM SERPL-SCNC: 3.7 MMOL/L (ref 3.5–5.3)
PR INTERVAL: 140 MS
PROT SERPL-MCNC: 7.3 G/DL (ref 6.4–8.4)
QRS AXIS: 17 DEGREES
QRSD INTERVAL: 92 MS
QT INTERVAL: 392 MS
QTC INTERVAL: 460 MS
RBC # BLD AUTO: 4.01 MILLION/UL (ref 3.88–5.62)
SODIUM SERPL-SCNC: 139 MMOL/L (ref 135–147)
T WAVE AXIS: 61 DEGREES
THC UR QL: POSITIVE
VENTRICULAR RATE: 83 BPM
WBC # BLD AUTO: 7.84 THOUSAND/UL (ref 4.31–10.16)

## 2024-08-24 PROCEDURE — 82077 ASSAY SPEC XCP UR&BREATH IA: CPT

## 2024-08-24 PROCEDURE — 99285 EMERGENCY DEPT VISIT HI MDM: CPT

## 2024-08-24 PROCEDURE — 99284 EMERGENCY DEPT VISIT MOD MDM: CPT

## 2024-08-24 PROCEDURE — 36415 COLL VENOUS BLD VENIPUNCTURE: CPT

## 2024-08-24 PROCEDURE — 83036 HEMOGLOBIN GLYCOSYLATED A1C: CPT

## 2024-08-24 PROCEDURE — 82948 REAGENT STRIP/BLOOD GLUCOSE: CPT

## 2024-08-24 PROCEDURE — 93010 ELECTROCARDIOGRAM REPORT: CPT | Performed by: STUDENT IN AN ORGANIZED HEALTH CARE EDUCATION/TRAINING PROGRAM

## 2024-08-24 PROCEDURE — 80053 COMPREHEN METABOLIC PANEL: CPT

## 2024-08-24 PROCEDURE — 83735 ASSAY OF MAGNESIUM: CPT

## 2024-08-24 PROCEDURE — 85025 COMPLETE CBC W/AUTO DIFF WBC: CPT

## 2024-08-24 PROCEDURE — 80307 DRUG TEST PRSMV CHEM ANLYZR: CPT

## 2024-08-24 PROCEDURE — HZ2ZZZZ DETOXIFICATION SERVICES FOR SUBSTANCE ABUSE TREATMENT: ICD-10-PCS | Performed by: INTERNAL MEDICINE

## 2024-08-24 PROCEDURE — 93005 ELECTROCARDIOGRAM TRACING: CPT

## 2024-08-24 RX ORDER — DIPHENHYDRAMINE HYDROCHLORIDE AND LIDOCAINE HYDROCHLORIDE AND ALUMINUM HYDROXIDE AND MAGNESIUM HYDRO
10 KIT EVERY 4 HOURS PRN
Status: DISCONTINUED | OUTPATIENT
Start: 2024-08-24 | End: 2024-08-27 | Stop reason: HOSPADM

## 2024-08-24 RX ORDER — CEPHALEXIN 500 MG/1
500 CAPSULE ORAL EVERY 8 HOURS SCHEDULED
Status: DISCONTINUED | OUTPATIENT
Start: 2024-08-24 | End: 2024-08-25

## 2024-08-24 RX ORDER — NICOTINE 21 MG/24HR
21 PATCH, TRANSDERMAL 24 HOURS TRANSDERMAL DAILY
Status: DISCONTINUED | OUTPATIENT
Start: 2024-08-25 | End: 2024-08-24

## 2024-08-24 RX ORDER — INSULIN LISPRO 100 [IU]/ML
10 INJECTION, SOLUTION INTRAVENOUS; SUBCUTANEOUS
Status: DISCONTINUED | OUTPATIENT
Start: 2024-08-24 | End: 2024-08-25

## 2024-08-24 RX ORDER — DIAZEPAM 10 MG/2ML
10 INJECTION, SOLUTION INTRAMUSCULAR; INTRAVENOUS ONCE
Status: COMPLETED | OUTPATIENT
Start: 2024-08-24 | End: 2024-08-24

## 2024-08-24 RX ORDER — ACETAMINOPHEN 325 MG/1
650 TABLET ORAL EVERY 6 HOURS PRN
Status: DISCONTINUED | OUTPATIENT
Start: 2024-08-24 | End: 2024-08-27 | Stop reason: HOSPADM

## 2024-08-24 RX ORDER — NICOTINE 21 MG/24HR
21 PATCH, TRANSDERMAL 24 HOURS TRANSDERMAL DAILY
Status: DISCONTINUED | OUTPATIENT
Start: 2024-08-24 | End: 2024-08-25

## 2024-08-24 RX ORDER — ONDANSETRON 2 MG/ML
4 INJECTION INTRAMUSCULAR; INTRAVENOUS EVERY 6 HOURS PRN
Status: DISCONTINUED | OUTPATIENT
Start: 2024-08-24 | End: 2024-08-27 | Stop reason: HOSPADM

## 2024-08-24 RX ORDER — ACETAMINOPHEN 325 MG/1
650 TABLET ORAL EVERY 6 HOURS PRN
Status: DISCONTINUED | OUTPATIENT
Start: 2024-08-24 | End: 2024-08-27

## 2024-08-24 RX ORDER — CEPHALEXIN 500 MG/1
500 CAPSULE ORAL EVERY 6 HOURS SCHEDULED
COMMUNITY
End: 2024-08-27

## 2024-08-24 RX ORDER — PREGABALIN 25 MG/1
25 CAPSULE ORAL 2 TIMES DAILY
Status: DISCONTINUED | OUTPATIENT
Start: 2024-08-24 | End: 2024-08-27

## 2024-08-24 RX ORDER — INSULIN LISPRO 100 [IU]/ML
10 INJECTION, SOLUTION INTRAVENOUS; SUBCUTANEOUS
Status: DISCONTINUED | OUTPATIENT
Start: 2024-08-25 | End: 2024-08-25

## 2024-08-24 RX ORDER — HYDROXYZINE HYDROCHLORIDE 50 MG/1
50 TABLET, FILM COATED ORAL 2 TIMES DAILY PRN
Status: DISCONTINUED | OUTPATIENT
Start: 2024-08-24 | End: 2024-08-27 | Stop reason: HOSPADM

## 2024-08-24 RX ORDER — TRAZODONE HYDROCHLORIDE 50 MG/1
50 TABLET, FILM COATED ORAL
Status: DISCONTINUED | OUTPATIENT
Start: 2024-08-24 | End: 2024-08-27 | Stop reason: HOSPADM

## 2024-08-24 RX ORDER — PANTOPRAZOLE SODIUM 40 MG/1
40 TABLET, DELAYED RELEASE ORAL DAILY
Status: DISCONTINUED | OUTPATIENT
Start: 2024-08-24 | End: 2024-08-27 | Stop reason: HOSPADM

## 2024-08-24 RX ORDER — ENOXAPARIN SODIUM 100 MG/ML
40 INJECTION SUBCUTANEOUS DAILY
Status: DISCONTINUED | OUTPATIENT
Start: 2024-08-25 | End: 2024-08-27 | Stop reason: HOSPADM

## 2024-08-24 RX ORDER — PHENOBARBITAL SODIUM 130 MG/ML
260 INJECTION, SOLUTION INTRAMUSCULAR; INTRAVENOUS ONCE
Status: COMPLETED | OUTPATIENT
Start: 2024-08-24 | End: 2024-08-24

## 2024-08-24 RX ORDER — ATORVASTATIN CALCIUM 40 MG/1
40 TABLET, FILM COATED ORAL DAILY
Status: DISCONTINUED | OUTPATIENT
Start: 2024-08-24 | End: 2024-08-27 | Stop reason: HOSPADM

## 2024-08-24 RX ORDER — INSULIN LISPRO 100 [IU]/ML
1-5 INJECTION, SOLUTION INTRAVENOUS; SUBCUTANEOUS
Status: DISCONTINUED | OUTPATIENT
Start: 2024-08-24 | End: 2024-08-25

## 2024-08-24 RX ORDER — SODIUM CHLORIDE 9 MG/ML
100 INJECTION, SOLUTION INTRAVENOUS CONTINUOUS
Status: DISCONTINUED | OUTPATIENT
Start: 2024-08-24 | End: 2024-08-25

## 2024-08-24 RX ADMIN — CEPHALEXIN 500 MG: 500 CAPSULE ORAL at 20:30

## 2024-08-24 RX ADMIN — PREGABALIN 25 MG: 25 CAPSULE ORAL at 17:35

## 2024-08-24 RX ADMIN — SODIUM CHLORIDE 100 ML/HR: 0.9 INJECTION, SOLUTION INTRAVENOUS at 14:22

## 2024-08-24 RX ADMIN — INSULIN LISPRO 5 UNITS: 100 INJECTION, SOLUTION INTRAVENOUS; SUBCUTANEOUS at 16:10

## 2024-08-24 RX ADMIN — ATORVASTATIN CALCIUM 40 MG: 40 TABLET, FILM COATED ORAL at 16:13

## 2024-08-24 RX ADMIN — PHENOBARBITAL SODIUM 260 MG: 130 INJECTION INTRAMUSCULAR; INTRAVENOUS at 20:29

## 2024-08-24 RX ADMIN — FOLIC ACID: 5 INJECTION, SOLUTION INTRAMUSCULAR; INTRAVENOUS; SUBCUTANEOUS at 16:00

## 2024-08-24 RX ADMIN — INSULIN LISPRO 10 UNITS: 100 INJECTION, SOLUTION INTRAVENOUS; SUBCUTANEOUS at 16:11

## 2024-08-24 RX ADMIN — DIAZEPAM 10 MG: 5 INJECTION INTRAMUSCULAR; INTRAVENOUS at 14:21

## 2024-08-24 RX ADMIN — PHENOBARBITAL SODIUM 260 MG: 130 INJECTION INTRAMUSCULAR; INTRAVENOUS at 14:22

## 2024-08-24 RX ADMIN — NICOTINE 21 MG: 21 PATCH, EXTENDED RELEASE TRANSDERMAL at 17:57

## 2024-08-24 RX ADMIN — DIPHENHYDRAMINE HYDROCHLORIDE AND LIDOCAINE HYDROCHLORIDE AND ALUMINUM HYDROXIDE AND MAGNESIUM HYDRO 10 ML: KIT at 20:30

## 2024-08-24 NOTE — ASSESSMENT & PLAN NOTE
Lab Results   Component Value Date    HGBA1C 8.1 (H) 04/05/2022       Recent Labs     08/24/24  1214   POCGLU 126       Blood Sugar Average: Last 72 hrs:  (P) 126    Obtain repeat Hgb A1c  Patient reports non-compliance with current medication. He states that he only takes insulin 10 units with meals.  Chart review  preformed- patient is prescribed Lantus 30 U daily, and insulin aspart 30 units with meals   Will SSI and insulin lispro 10 units with meals   Hypoglycemic protocol   Diabetic Diet

## 2024-08-24 NOTE — ASSESSMENT & PLAN NOTE
H/o chronic daily alcohol consumption, denies h/o withdrawal seizures  Last drink: 8/23/24  Ethanol lvl: < 10   Continue SEWS protocol with symptom-triggered phenobarbital for medically-assisted alcohol withdrawal  Current symptoms include anxiety, tremors  Received a total of 910 mg of phenobarbital without complication   Initiate telemetry and pulse oximetry   Continue to monitor vitals, symptoms

## 2024-08-24 NOTE — ASSESSMENT & PLAN NOTE
Continue Atarax 50 mg q6 hours prn   Currently on Wellbutrin - will also hold during hospitalization, patient can resume upon discharge

## 2024-08-24 NOTE — ASSESSMENT & PLAN NOTE
Intermittent cocaine use   Last use three days ago   Denies any current chest pain   Encourage Cessation

## 2024-08-24 NOTE — ASSESSMENT & PLAN NOTE
Patient with a history of HTN  Home medication regimen: Lisinopril 5 mg   BP hypertensive upon arrival to the unit in the setting of acute alcohol withdrawal and chronic HTN  Most Recent 140/80  Will hold during acute withdrawal   Plan to resume anti-hypertensive therapy if BP remains high after resolution of acute withdrawal  Continue monitoring BP

## 2024-08-24 NOTE — ASSESSMENT & PLAN NOTE
Pt with a h/o chronic heavy alcohol use   Drinks 12 pack of beer  daily   Denies H/o withdrawal seizures  Interested naltrexone at this time  Withdrawal management as above  Initiate IVFs, IV thiamine, folic acid, and MVI  Consult case management/CRS for assistance with aftercare resources - pt interested in inpatient drug and alcohol at this time

## 2024-08-24 NOTE — ASSESSMENT & PLAN NOTE
Lab Results   Component Value Date    HGBA1C 8.4 (H) 08/24/2024       Recent Labs     08/24/24  1553 08/24/24  2049 08/25/24  0431 08/25/24  0630   POCGLU 517* 278* 529* 343*       Blood Sugar Average: Last 72 hrs:  (P) 358.6    Hgb A1c 8.4  Patient reports non-compliance with current medication. He states that he only takes insulin 10 units with meals.  Chart review  preformed- patient is prescribed Lantus 30 U daily, and insulin aspart 30 units with meals   Poorly controlled blood sugars- patient not adhering to diet  Initiate Lantus 5 U daily   Continue Insulin Lispro 10 units with meals  Algorithm 4 SSI   Continue to adjust insulin as needed for better control   Hypoglycemic protocol   Diabetic Diet

## 2024-08-24 NOTE — ASSESSMENT & PLAN NOTE
H/o chronic daily alcohol consumption, denies h/o withdrawal seizures  Last drink: 8/23/24  Ethanol lvl: < 10   Follow SEWS protocol with symptom-triggered phenobarbital for medically-assisted alcohol withdrawal  Current symptoms include anxiety, tremors, HTN   SEWS score upon admission 8   Administer 260 mg phenobarbital + valium 10 mg IV   Initiate telemetry and pulse oximetry   Continue to monitor vitals, symptoms

## 2024-08-24 NOTE — ASSESSMENT & PLAN NOTE
I personally provided 7 minutes of tobacco cessation education  Patient is interested in NRT with nicotine patch

## 2024-08-24 NOTE — ED PROVIDER NOTES
History  Chief Complaint   Patient presents with    Detox Evaluation     Pt arrives for detox for alcohol. Pt drinks 12 pack of beer/day. Last drink was last night around 11. Pt states he uses prescription adderall and marijuana, denies other street drugs. Denies SI.      54-year-old male with past medical history of type 1 diabetes mellitus, hypertension, hyperlipidemia, GERD presents to emergency department requesting detox from alcohol.  Has been drinking at least a 12 pack of beer daily for the past 10 or so years, does not report any past detox or rehab stays.  Last drink 11 PM last night.  Denies withdrawal symptoms at this time.  Denies acute medical complaints.  Denies history of seizures.  Denies SI HI or hallucinations.  Reports marijuana use, denies other drug use.        History provided by:  Patient  Detox Evaluation  Associated symptoms: no abdominal pain, no headaches, no nausea, no seizures, no shortness of breath, no vomiting and no weakness        Prior to Admission Medications   Prescriptions Last Dose Informant Patient Reported? Taking?   Apidra SoloStar 100 units/mL injection pen   Yes No   Sig: TO BE USED VIA INSULIN PUMP. UP  UNITS/DAY, NORMAL   DULoxetine (CYMBALTA) 30 mg delayed release capsule   Yes No   Sig: Take 30 mg by mouth daily   Elastic Bandages & Supports (Medical Compression Stockings) MISC   No No   Sig: Use daily Knee High 20-30mmHg   Elastic Bandages & Supports (Medical Compression Thigh High) MISC   No No   Sig: Use daily 20-30mmHg   Lantus SoloStar 100 units/mL injection pen  Self Yes No   Si Units daily   Methylphenidate HCl ER 18 MG TB24   Yes No   Sig: Take 18 mg by mouth daily   amphetamine-dextroamphetamine (ADDERALL XR) 20 MG 24 hr capsule   Yes No   Sig: Take 20 mg by mouth every morning   atorvastatin (LIPITOR) 40 mg tablet  Self Yes No   Sig: Take 40 mg by mouth daily   buPROPion (WELLBUTRIN XL) 300 mg 24 hr tablet   Yes No   Sig: Take 300 mg by mouth  every morning   fexofenadine (ALLEGRA) 60 MG tablet   Yes No   Sig: Take 60 mg by mouth daily   folic acid (FOLVITE) 800 MCG tablet  Self Yes No   Sig: Take 400 mcg by mouth daily   gabapentin (NEURONTIN) 300 mg capsule  Self Yes No   Sig: Take 300 mg by mouth Three times a day   hydrOXYzine HCL (ATARAX) 25 mg tablet   No No   Sig: Take 1 tablet (25 mg total) by mouth every 6 (six) hours as needed for itching   hydrOXYzine HCL (ATARAX) 50 mg tablet   Yes No   Sig: Take 50 mg by mouth 2 (two) times a day as needed   ibuprofen (MOTRIN) 800 mg tablet   Yes No   Sig: TAKE 1 TABLET (800 MG TOTAL) BY MOUTH DAILY AS NEEDED FOR MODERATE PAIN (PAIN SCORE 4-6).   insulin aspart (NovoLOG FlexPen) 100 UNIT/ML injection pen  Self Yes No   Sig: INJECT 30 UNITS UNDER THE SKIN 3 (THREE) TIMES A DAY BEFORE MEALS. PT IS ON SLIDING SCALE   insulin glulisine (Apidra SoloStar) 100 units/mL injection pen   Yes No   Sig: To be used via insulin pump. Up to 100 units/day, Normal   lisinopril (ZESTRIL) 5 mg tablet   Yes No   Sig: Take 5 mg by mouth daily   pantoprazole (PROTONIX) 40 mg tablet   Yes No   Sig: Take 40 mg by mouth daily   pregabalin (LYRICA) 25 mg capsule   Yes No   Sig: Take 1 capsule by mouth 2 (two) times a day   vitamin B-12 (VITAMIN B-12) 500 mcg tablet  Self Yes No   Sig: Take 500 mcg by mouth daily   zolpidem (AMBIEN) 10 mg tablet   Yes No   Sig: Take 10 mg by mouth daily at bedtime as needed      Facility-Administered Medications: None       Past Medical History:   Diagnosis Date    Diabetes mellitus (HCC)     GERD (gastroesophageal reflux disease)     Hyperlipidemia     Hypertension        Past Surgical History:   Procedure Laterality Date    HAND NERVE REPAIR Right     LEG SURGERY         Family History   Problem Relation Age of Onset    No Known Problems Mother     No Known Problems Father     No Known Problems Sister     No Known Problems Brother     No Known Problems Maternal Grandmother     No Known Problems  Maternal Grandfather     No Known Problems Paternal Grandmother     No Known Problems Paternal Grandfather     No Known Problems Maternal Aunt     No Known Problems Maternal Uncle     No Known Problems Paternal Aunt     No Known Problems Paternal Uncle     No Known Problems Cousin      I have reviewed and agree with the history as documented.    E-Cigarette/Vaping    E-Cigarette Use Never User      E-Cigarette/Vaping Substances     Social History     Tobacco Use    Smoking status: Every Day     Current packs/day: 1.00     Types: Cigarettes    Smokeless tobacco: Never   Vaping Use    Vaping status: Never Used   Substance Use Topics    Alcohol use: Yes     Alcohol/week: 12.0 standard drinks of alcohol     Types: 12 Standard drinks or equivalent per week    Drug use: Yes     Frequency: 7.0 times per week     Types: Marijuana       Review of Systems   Constitutional:  Negative for diaphoresis and fever.   Eyes:  Negative for visual disturbance.   Respiratory:  Negative for shortness of breath.    Cardiovascular:  Negative for chest pain.   Gastrointestinal:  Negative for abdominal pain, diarrhea, nausea and vomiting.   Skin:  Negative for rash and wound.   Neurological:  Negative for dizziness, tremors, seizures, syncope, weakness and headaches.   All other systems reviewed and are negative.      Physical Exam  Physical Exam  Vitals and nursing note reviewed.   Constitutional:       General: He is awake. He is not in acute distress.     Appearance: Normal appearance. He is not ill-appearing, toxic-appearing or diaphoretic.   HENT:      Head: Normocephalic and atraumatic.      Mouth/Throat:      Lips: Pink.      Mouth: Mucous membranes are moist.   Eyes:      General: Vision grossly intact.      Extraocular Movements: Extraocular movements intact.      Pupils: Pupils are equal, round, and reactive to light.   Cardiovascular:      Rate and Rhythm: Normal rate and regular rhythm.      Heart sounds: Normal heart sounds.    Pulmonary:      Effort: Pulmonary effort is normal. No respiratory distress.      Breath sounds: Normal breath sounds.   Abdominal:      General: There is no distension.      Palpations: Abdomen is soft.      Tenderness: There is no abdominal tenderness.   Skin:     General: Skin is warm and dry.      Capillary Refill: Capillary refill takes less than 2 seconds.      Findings: No rash.   Neurological:      Mental Status: He is alert and oriented to person, place, and time.      Motor: Tremor present.      Gait: Gait normal.         Vital Signs  ED Triage Vitals [08/24/24 1150]   Temperature Pulse Respirations Blood Pressure SpO2   98.2 °F (36.8 °C) 93 18 150/80 99 %      Temp Source Heart Rate Source Patient Position - Orthostatic VS BP Location FiO2 (%)   Oral Monitor Sitting Left arm --      Pain Score       --           Vitals:    08/24/24 1150   BP: 150/80   Pulse: 93   Patient Position - Orthostatic VS: Sitting         Visual Acuity      ED Medications  Medications - No data to display    Diagnostic Studies  Results Reviewed       Procedure Component Value Units Date/Time    Rapid drug screen, urine [[id number]]  (Abnormal) Collected: 08/24/24 1220    Lab Status: Final result Specimen: Urine, Clean Catch Updated: 08/24/24 1241     Amph/Meth UR Negative     Barbiturate Ur Negative     Benzodiazepine Urine Negative     Cocaine Urine Positive     Methadone Urine Negative     Opiate Urine Negative     PCP Ur Negative     THC Urine Positive     Oxycodone Urine Negative     Fentanyl Urine Negative     HYDROCODONE URINE Negative    Narrative:      Presumptive report. If requested, specimen will be sent to reference lab for confirmation.  FOR MEDICAL PURPOSES ONLY.   IF CONFIRMATION NEEDED PLEASE CONTACT THE LAB WITHIN 5 DAYS.    Drug Screen Cutoff Levels:  AMPHETAMINE/METHAMPHETAMINES  1000 ng/mL  BARBITURATES     200 ng/mL  BENZODIAZEPINES     200 ng/mL  COCAINE      300 ng/mL  METHADONE      300  ng/mL  OPIATES      300 ng/mL  PHENCYCLIDINE     25 ng/mL  THC       50 ng/mL  OXYCODONE      100 ng/mL  FENTANYL      5 ng/mL  HYDROCODONE     300 ng/mL    Ethanol [[id number]]  (Normal) Collected: 08/24/24 1205    Lab Status: Final result Specimen: Blood from Arm, Left Updated: 08/24/24 1240     Ethanol Lvl <10 mg/dL     Comprehensive metabolic panel [[id number]]  (Abnormal) Collected: 08/24/24 1205    Lab Status: Final result Specimen: Blood from Arm, Left Updated: 08/24/24 1230     Sodium 139 mmol/L      Potassium 3.7 mmol/L      Chloride 101 mmol/L      CO2 29 mmol/L      ANION GAP 9 mmol/L      BUN 18 mg/dL      Creatinine 1.00 mg/dL      Glucose 77 mg/dL      Calcium 10.1 mg/dL      AST 23 U/L      ALT 25 U/L      Alkaline Phosphatase 134 U/L      Total Protein 7.3 g/dL      Albumin 4.7 g/dL      Total Bilirubin 0.34 mg/dL      eGFR 86 ml/min/1.73sq m     Narrative:      [employer] guidelines for Chronic Kidney Disease (CKD):     Stage 1 with normal or high GFR (GFR > 90 mL/min/1.73 square meters)    Stage 2 Mild CKD (GFR = 60-89 mL/min/1.73 square meters)    Stage 3A Moderate CKD (GFR = 45-59 mL/min/1.73 square meters)    Stage 3B Moderate CKD (GFR = 30-44 mL/min/1.73 square meters)    Stage 4 Severe CKD (GFR = 15-29 mL/min/1.73 square meters)    Stage 5 End Stage CKD (GFR <15 mL/min/1.73 square meters)  Note: GFR calculation is accurate only with a steady state creatinine    Magnesium [[id number]]  (Normal) Collected: 08/24/24 1205    Lab Status: Final result Specimen: Blood from Arm, Left Updated: 08/24/24 1230     Magnesium 1.9 mg/dL     CBC and differential [[id number]]  (Abnormal) Collected: 08/24/24 1205    Lab Status: Final result Specimen: Blood from Arm, Left Updated: 08/24/24 1216     WBC 7.84 Thousand/uL      RBC 4.01 Million/uL      Hemoglobin 13.1 g/dL      Hematocrit 40.2 %       fL      MCH 32.7 pg      MCHC 32.6 g/dL      RDW 11.6 %      MPV 8.9 fL      Platelets  331 Thousands/uL      nRBC 0 /100 WBCs      Segmented % 46 %      Immature Grans % 0 %      Lymphocytes % 38 %      Monocytes % 9 %      Eosinophils Relative 6 %      Basophils Relative 1 %      Absolute Neutrophils 3.59 Thousands/µL      Absolute Immature Grans 0.01 Thousand/uL      Absolute Lymphocytes 2.96 Thousands/µL      Absolute Monocytes 0.73 Thousand/µL      Eosinophils Absolute 0.50 Thousand/µL      Basophils Absolute 0.05 Thousands/µL     Fingerstick Glucose (POCT) [[id number]]  (Normal) Collected: 08/24/24 1214    Lab Status: Final result Specimen: Blood Updated: 08/24/24 1215     POC Glucose 126 mg/dl                    No orders to display              Procedures  Procedures         ED Course  ED Course as of 08/24/24 1307   Sat Aug 24, 2024   1224 Procedure Note: EKG  Date/Time: 08/24/24 12:24 PM   Performed by: Padmini Gallegos   Authorized by: Padmini Gallegos  ECG interpreted by me, the ED Provider: yes   The EKG demonstrates:  Rate 83 bpm  Rhythm NSR   QTc 460 ms  No ST elevations/depressions              CIWA-Ar Score       Row Name 08/24/24 1300             CIWA-Ar    Nausea and Vomiting 0      Tactile Disturbances 0      Tremor 0      Auditory Disturbances 0      Paroxysmal Sweats 0      Visual Disturbances 0      Anxiety 0      Agitation 0      Orientation and Clouding of Sensorium 0                                        SBIRT 20yo+      Flowsheet Row Most Recent Value   Initial Alcohol Screen: US AUDIT-C     1. How often do you have a drink containing alcohol? 6 Filed at: 08/24/2024 1149   2. How many drinks containing alcohol do you have on a typical day you are drinking?  6 Filed at: 08/24/2024 1149   3a. Male UNDER 65: How often do you have five or more drinks on one occasion? 5 Filed at: 08/24/2024 1149   Audit-C Score 17 Filed at: 08/24/2024 1149   Full Alcohol Screen: US AUDIT    4. How often during the last year have you found that you were not able to stop drinking once you had started? 4  "Filed at: 08/24/2024 1149   5. How often during past year have you failed to do what was normally expected of you because of drinking?  3 Filed at: 08/24/2024 1149   6. How often in past year have you needed a first drink in the morning to get yourself going after a heavy drinking session?  3 Filed at: 08/24/2024 1149   7. How often in past year have you had feeling of guilt or remorse after drinking?  4 Filed at: 08/24/2024 1149   8. How often in past year have you been unable to remember what happened night before because you had been drinking?  2 Filed at: 08/24/2024 1149   9. Have you or someone else been injured as a result of your drinking?  4 Filed at: 08/24/2024 1149   10. Has a relative, friend, doctor or other health worker been concerned about your drinking and suggested you cut down?  4 Filed at: 08/24/2024 1149   AUDIT Total Score 41 Filed at: 08/24/2024 1149   TWAN: How many times in the past year have you...    Used an illegal drug or used a prescription medication for non-medical reasons? Never Filed at: 08/24/2024 1149                      Medical Decision Making  Detox from alcohol. No seizure hx. AAOx4. Last drink last night. +mild tremor on exam. Denies other withdrawal symptoms. VSS. No acute medical complaints.  ECG without acute ischemic changes or dysrhythmia.  Mildly elevated ALP, other LFTs WNL. Discussed with [facility], accepted for admission.     All imaging and/or lab testing discussed with patient. Patient and/or family members verbalizes understanding and agrees with plan for admission. Patient is stable for admission.     Portions of the record may have been created with voice recognition software. Occasional wrong word or \"sound a like\" substitutions may have occurred due to the inherent limitations of voice recognition software. Read the chart carefully and recognize, using context, where substitutions have occurred.    Amount and/or Complexity of Data Reviewed  Labs: " ordered.    Risk  Decision regarding hospitalization.                 Disposition  Final diagnoses:   Alcohol use disorder     Time reflects when diagnosis was documented in both MDM as applicable and the Disposition within this note       Time User Action Codes Description Comment    8/24/2024 12:53 PM Padmini Gallegos Add [F10.90] Alcohol use disorder           ED Disposition       ED Disposition   Admit    Condition   Stable    Date/Time   Sat Aug 24, 2024 1561    Comment   Case was discussed with Lindsay Mccarty PA-C and the patient's admission status was agreed to be Admission Status: inpatient status to the service of Dr. Reddy .               Follow-up Information    None         Patient's Medications   Discharge Prescriptions    No medications on file       No discharge procedures on file.    PDMP Review       None            ED Provider  Electronically Signed by             Padmini Gallegos PA-C  08/24/24 1813

## 2024-08-24 NOTE — H&P
HISTORY & PHYSICAL EXAM  DEPARTMENT OF MEDICAL TOXICOLOGY  LEVEL 4 MEDICAL DETOX UNIT  Cesar Theodore 54 y.o. male MRN: [id number]  Unit/Bed#: [address] [place] [address] [id number] Encounter: [id number]      Reason for Admission/Principal Problem: Ethanol withdrawal, Ethanol use disorder  Admitting Provider: Lindsay Mccarty PA-C  Attending Provider: Akosua Peña*   8/24/2024 11:45 AM        * Alcohol withdrawal syndrome with complication (HCC)  Assessment & Plan  H/o chronic daily alcohol consumption, denies h/o withdrawal seizures  Last drink: 8/23/24  Ethanol lvl: < 10   Follow SEWS protocol with symptom-triggered phenobarbital for medically-assisted alcohol withdrawal  Current symptoms include anxiety, tremors, HTN   SEWS score upon admission 8   Administer 260 mg phenobarbital + valium 10 mg IV   Initiate telemetry and pulse oximetry   Continue to monitor vitals, symptoms        Alcohol use disorder, severe, dependence (HCC)  Assessment & Plan  Pt with a h/o chronic heavy alcohol use   Drinks 12 pack of beer  daily   Denies H/o withdrawal seizures  Interested naltrexone at this time  Withdrawal management as above  Initiate IVFs, IV thiamine, folic acid, and MVI  Consult case management/CRS for assistance with aftercare resources - pt interested in inpatient drug and alcohol at this time     Tobacco use disorder  Assessment & Plan  I personally provided 7 minutes of tobacco cessation education  Patient is interested in NRT with nicotine patch     ADHD  Assessment & Plan  Currently takes Adderall XL 20 mg   PDMP reviewed - Last filled 7/31/24  Will hold during acute withdrawal setting and then resume     Cocaine use disorder (HCC)  Assessment & Plan  Intermittent cocaine use   Last use three days ago   Denies any current chest pain   Encourage Cessation     Anxiety  Assessment & Plan  Continue Atarax 50 mg q6 hours prn   Currently on Wellbutrin - will also hold during hospitalization, patient can resume upon  discharge     HTN (hypertension)  Assessment & Plan  Patient with a history of HTN  Home medication regimen: Lisinopril 5 mg   BP hypertensive upon arrival to the unit in the setting of acute alcohol withdrawal and chronic HTN  Most Recent 140/80  Will hold during acute withdrawal   Plan to resume anti-hypertensive therapy if BP remains high after resolution of acute withdrawal  Continue monitoring BP     Hyperlipidemia  Assessment & Plan  Continue atorvastatin     DM I (diabetes mellitus, type I) ([facility])  Assessment & Plan  Lab Results   Component Value Date    HGBA1C 8.1 (H) 04/05/2022       Recent Labs     08/24/24  1214   POCGLU 126         Blood Sugar Average: Last 72 hrs:  (P) 126    Obtain repeat Hgb A1c  Patient reports non-compliance with current medication. He states that he only takes insulin 10 units with meals.  Chart review  preformed- patient is prescribed Lantus 30 U daily, and insulin aspart 30 units with meals   Will SSI and insulin lispro 10 units with meals   Hypoglycemic protocol   Diabetic Diet       Gastroesophageal reflux disease without esophagitis  Assessment & Plan  Continue protonix                VTE Prophylaxis: Enoxaparin (Lovenox)  / sequential compression device   Code Status: Full Code       Anticipated Length of Stay:  Patient will be admitted on an Inpatient basis with an anticipated length of stay of  2  midnights.   Justification for Hospital Stay: alcohol withdrawal     For any questions or concerns, please Tiger Text the advanced practitioner in the role of [facility] On Call      This patient qualifies for Level IV medically managed intensive inpatient services under the criteria set by the American Society of Addiction Medicine, including dimensions 1-3. The patient is in withdrawal (or is intoxicated with high risk of withdrawal), with severe and unstable medical and/or psychiatric (dual diagnosis) problems, requiring requires 24-hour medical and nursing care and the full  resources of a [facility].          SEWS PHENOBARBITAL PROTOCOL FOR ALCOHOL WITHDRAWAL    Admit patient to medical detox unit and continue supportive care and stabilization of acute ethanol withdrawal per medical toxicology/detox treatment pathway. Monitor ethanol withdrawal severity via the Severity of Ethanol Withdrawal Scale (SEWS) Q4 hours and then hourly if/when SEWS > 6. Treat withdrawal per pathway and reassess Q30-60 minutes.           Mild SEWS Score 1-6  Administer medications* (IV or PO; PO preferred):  If initial SEWS score: diazepam 10mg PO/IV x 1 AND phenobarbital 65 mg PO/IV x 1  If repeat SEWS score 1-6: phenobarbital 65 mg PO/IV q1 hour x 5 doses maximum   Reassessment:   SEWS q1 hour after each dose until SEWS 0 x 2 hours  VS q1 hours (until SEWS 0, then q4 hours)  Notify provider for bedside evaluation if 5-dose maximum is reached, RASS of -3 to -5, or SEWS score escalates to moderate or severe.   Moderate SEWS Score 7-12  Administer medications* (IV):  If initial SEWS score: diazepam 10mg IV x 1 AND phenobarbital 260 mg IV x 1  If repeat SEWS score 7-12 or score escalated from mild: phenobarbital 130 mg IV q30 minutes x 5 doses maximum   Reassessment:  SEWS q30 minutes after each dose until SEWS < 7 (then hourly until SEWS 0 x 2 hours)  VS q30 minutes until SEWS < 7 (then hourly until SEWS 0, then q4 hours)  Notify provider for bedside evaluation if 5-dose maximum is reached, RASS of -3 to -5, or SEWS score escalates to severe.   Severe SEWS Score ? 13  Administer medications* (IV):  If initial SEWS score: Diazepam 10 mg IV x 1 AND phenobarbital 650 mg IV piggyback x 1 over 15-30 minutes  If repeat SEWS score ? 13 or score escalated from mild or moderate: phenobarbital 130 mg IV q30 minutes x 5 doses maximum   Reassessment:  SEWS q30 minutes after each dose until SEWS < 7 (then hourly until SEWS 0 x 2 hours)   VS q30 minutes until SEWS < 7 (then hourly until SEWS 0, then q4 hours)  Notify  provider for bedside evaluation if 5-dose maximum is reached or RASS of -3 to -5   *Hold medications and notify provider if CNS depression, respirations < 10/min, or RASS of -3 to -5.         Medications to be administered adjunctively if more than 2 grams of phenobarbital is needed for stabilization of withdrawal; require attending approval.   Dexmedetomidine infusion 0.1-1mcg/kg/hr IV infusion, titratable to reduced agitation (Goal: RASS -2)  Ketamine   Acute agitated delirium: 1-2 mg/kg IV or 4-5 mg/kg IM  Refractory withdrawal: 0.1-1mg/kg/hr IV infusion, titratable to reduced agitation (Goal: RASS -2)    Further evaluation, screening and treatment:  Evaluate complete metabolic panel, transaminases, INR, and lipase. Assess hepatic ultrasound for any sign of alcoholic liver disease or cirrhosis, and ultimately refer for further hepatic evaluation and care as/if indicated.    Additional medications for ethanol associated malnutrition:  Thiamine 100 mg IV daily, increase to 500 mg TID for signs/symptoms of Wernicke's Encephalopathy or Wernicke Korsakoff Syndrome   Folic acid 1 mg IV daily   Multivitamin PO daily      Will offer first monthly injection of Naltrexone 380 mg IM, once patient is stabilized, as it has been shown to assist in decreasing cravings for ethanol.     Evaluate and treat for coexisting substance use, such as opioids and nicotine. Discuss risk factors for infectious disease, such as history of intravenous drug abuse, and offer hepatitis and HIV screening if indicated.     Case management consultation to assist with coordination of subsequent treatment after discharge.          Hx and PE limited by: none    HPI: Cesar Theodore is a 54 y.o. year old male with a past medical history of HTN, HLD, Type I DM, GERD, ADHD, and anxiety. Patient's alcohol withdrawal seeking detoxification. Patient initially presented to the [facility] ED requesting alcohol detox with sx of HTN, anxiety, and tremors and was  subsequently transferred to the [facility] medical detox unit for medical management of alcohol withdrawal. Pt reports drinking 12 pack of beer daily, and his last drink was 8/23/24. Serum alcohol was < 10  in the ED. Admits to a history of chronic alcohol use for the last 10 years. Denies any history of alcohol withdrawal seizures. At this time patient reports he feels withdrawal symptoms of anxiety and tremors. He is interested in starting Naltrexone during this hospitalization for alcohol use disorder. Patient also is interested in purusing inpatient drug and alcohol rehab upon discharge.       Preferred alcoholic beverage(s): 12 packs of beer   Quantity and frequency of alcohol intake: Daily   Use of any ethanol substitutes (toxic alcohols): no  Date/Time of last alcohol intake: 8/23/24 11pm  Current signs and symptoms of ethanol withdrawal: anxiety, diaphoresis, and nausea    SEWS Total Score: 8 (8/24/2024  1:44 PM)      Ethanol Withdrawal History  Previous ethanol withdrawal? yes  Prior inpatient treatment for ethanol withdrawal? no  Prior outpatient treatment for ethanol withdrawal? no  History of seizures with prior ethanol withdrawal? no  Prior treatment with naltrexone (Vivitrol)? no  Current treatment with naltrexone (Vivitrol)? no  Other current treatment for ethanol use disorder? no  Co-existing substance use? no    Review of PDMP: yes     Social History     Substance and Sexual Activity   Alcohol Use Yes    Alcohol/week: 12.0 standard drinks of alcohol    Types: 12 Standard drinks or equivalent per week     Social History     Substance and Sexual Activity   Drug Use Yes    Frequency: 7.0 times per week    Types: Marijuana     Social History     Tobacco Use   Smoking Status Every Day    Current packs/day: 1.00    Types: Cigarettes   Smokeless Tobacco Never       Review of Systems   Constitutional:  Positive for diaphoresis.   Eyes: Negative.    Respiratory: Negative.     Cardiovascular: Negative.     Gastrointestinal:  Negative for diarrhea, nausea and vomiting.   Musculoskeletal: Negative.    Skin: Negative.    Neurological:  Positive for tremors.   Psychiatric/Behavioral:  The patient is nervous/anxious.        Historical Information   Past Medical History:   Diagnosis Date    Diabetes mellitus (HCC)     GERD (gastroesophageal reflux disease)     Hyperlipidemia     Hypertension      Past Surgical History:   Procedure Laterality Date    HAND NERVE REPAIR Right     LEG SURGERY       Family History   Problem Relation Age of Onset    No Known Problems Mother     No Known Problems Father     No Known Problems Sister     No Known Problems Brother     No Known Problems Maternal Grandmother     No Known Problems Maternal Grandfather     No Known Problems Paternal Grandmother     No Known Problems Paternal Grandfather     No Known Problems Maternal Aunt     No Known Problems Maternal Uncle     No Known Problems Paternal Aunt     No Known Problems Paternal Uncle     No Known Problems Cousin      Social History   Marital Status: Single   Occupation: Construction  Patient Pre-hospital Living Situation: Lives alone  Patient Pre-hospital Level of Mobility: Independent  Patient Pre-hospital Diet Restrictions: None     Allergies   Allergen Reactions    Pollen Extract Sneezing       Prior to Admission medications    Medication Sig Start Date End Date Taking? Authorizing Provider   amphetamine-dextroamphetamine (ADDERALL XR) 20 MG 24 hr capsule Take 20 mg by mouth every morning 2/22/23  Yes Historical Provider, MD Diony Ferguson 100 units/mL injection pen TO BE USED VIA INSULIN PUMP. UP  UNITS/DAY, NORMAL 12/17/22  Yes Historical Provider, MD   buPROPion (WELLBUTRIN XL) 300 mg 24 hr tablet Take 300 mg by mouth every morning 12/9/22  Yes Historical Provider, MD   hydrOXYzine HCL (ATARAX) 50 mg tablet Take 50 mg by mouth 2 (two) times a day as needed 12/9/22  Yes Historical Provider, MD   insulin aspart (NovoLOG  FlexPen) 100 UNIT/ML injection pen INJECT 30 UNITS UNDER THE SKIN 3 (THREE) TIMES A DAY BEFORE MEALS. PT IS ON SLIDING SCALE 8/18/20  Yes Historical Provider, MD   insulin glulisine (Apidra SoloStar) 100 units/mL injection pen To be used via insulin pump. Up to 100 units/day, Normal 11/1/22  Yes Historical Provider, MD   Lantus SoloStar 100 units/mL injection pen 30 Units daily 11/22/20  Yes Historical Provider, MD   lisinopril (ZESTRIL) 5 mg tablet Take 5 mg by mouth daily 12/5/22  Yes Historical Provider, MD   pantoprazole (PROTONIX) 40 mg tablet Take 40 mg by mouth daily 12/5/22  Yes Historical Provider, MD   atorvastatin (LIPITOR) 40 mg tablet Take 40 mg by mouth daily 3/11/20 3/11/21  Historical Provider, MD   DULoxetine (CYMBALTA) 30 mg delayed release capsule Take 30 mg by mouth daily 2/21/23 6/21/23  Historical Provider, MD   Elastic Bandages & Supports (Medical Compression Stockings) MISC Use daily Knee High 20-30mmHg  Patient not taking: Reported on 8/24/2024 12/7/20   Carla Enciso MD   Elastic Bandages & Supports (Medical Compression Thigh High) MISC Use daily 20-30mmHg  Patient not taking: Reported on 8/24/2024 12/7/20   Carla Enciso MD   fexofenadine (ALLEGRA) 60 MG tablet Take 60 mg by mouth daily  Patient not taking: Reported on 8/24/2024 12/5/22   Historical Provider, MD   folic acid (FOLVITE) 800 MCG tablet Take 400 mcg by mouth daily 5/26/20 5/26/21  Historical Provider, MD   gabapentin (NEURONTIN) 300 mg capsule Take 300 mg by mouth Three times a day 2/21/20 2/20/21  Historical Provider, MD   hydrOXYzine HCL (ATARAX) 25 mg tablet Take 1 tablet (25 mg total) by mouth every 6 (six) hours as needed for itching  Patient not taking: Reported on 8/24/2024 9/19/23   Otoniel Baer MD   ibuprofen (MOTRIN) 800 mg tablet TAKE 1 TABLET (800 MG TOTAL) BY MOUTH DAILY AS NEEDED FOR MODERATE PAIN (PAIN SCORE 4-6). 12/5/22   Historical Provider, MD   Methylphenidate HCl ER 18 MG TB24 Take 18  mg by mouth daily  Patient not taking: Reported on 8/24/2024 11/11/22   Historical Provider, MD   pregabalin (LYRICA) 25 mg capsule Take 1 capsule by mouth 2 (two) times a day  Patient not taking: Reported on 8/24/2024 8/29/22   Historical Provider, MD   vitamin B-12 (VITAMIN B-12) 500 mcg tablet Take 500 mcg by mouth daily  Patient not taking: Reported on 8/24/2024 9/1/20   Historical Provider, MD   zolpidem (AMBIEN) 10 mg tablet Take 10 mg by mouth daily at bedtime as needed  Patient not taking: Reported on 8/24/2024 12/9/22   Historical Provider, MD   ciprofloxacin-dexamethasone (CIPRODEX) otic suspension Administer 4 drops to the right ear 2 (two) times a day 12/2/20 12/18/20  Fritz Rodriguez, DO       Current Facility-Administered Medications   Medication Dose Route Frequency    acetaminophen (TYLENOL) tablet 650 mg  650 mg Oral Q6H PRN    acetaminophen (TYLENOL) tablet 650 mg  650 mg Oral Q6H PRN    atorvastatin (LIPITOR) tablet 40 mg  40 mg Oral Daily    [START ON 8/25/2024] enoxaparin (LOVENOX) subcutaneous injection 40 mg  40 mg Subcutaneous Daily    folic acid 1 mg, thiamine (VITAMIN B1) 100 mg in sodium chloride 0.9 % 100 mL IV piggyback   Intravenous Daily    hydrOXYzine HCL (ATARAX) tablet 50 mg  50 mg Oral BID PRN    insulin lispro (HumALOG/ADMELOG) 100 units/mL subcutaneous injection 1-5 Units  1-5 Units Subcutaneous TID AC    [START ON 8/25/2024] insulin lispro (HumALOG/ADMELOG) 100 units/mL subcutaneous injection 10 Units  10 Units Subcutaneous Daily With Breakfast    insulin lispro (HumALOG/ADMELOG) 100 units/mL subcutaneous injection 10 Units  10 Units Subcutaneous Daily With Lunch    insulin lispro (HumALOG/ADMELOG) 100 units/mL subcutaneous injection 10 Units  10 Units Subcutaneous Daily With Dinner    ondansetron (ZOFRAN) injection 4 mg  4 mg Intravenous Q6H PRN    pantoprazole (PROTONIX) EC tablet 40 mg  40 mg Oral Daily    pregabalin (LYRICA) capsule 25 mg  25 mg Oral BID    sodium  chloride 0.9 % infusion  100 mL/hr Intravenous Continuous    traZODone (DESYREL) tablet 50 mg  50 mg Oral HS PRN       Continuous Infusions:sodium chloride, 100 mL/hr, Last Rate: 100 mL/hr (08/24/24 1422)             Objective     No intake or output data in the 24 hours ending 08/24/24 1505    Invasive Devices:   Peripheral IV 08/24/24 Left Antecubital (Active)       Vitals   Vitals:    08/24/24 1150 08/24/24 1336   BP: 150/80 140/80   TempSrc: Oral Temporal   Pulse: 93 88   Resp: 18 18   Patient Position - Orthostatic VS: Sitting Sitting   Temp: 98.2 °F (36.8 °C) 97.8 °F (36.6 °C)       Physical Exam  Vitals and nursing note reviewed.   Constitutional:       General: He is in acute distress.      Appearance: He is diaphoretic.   Eyes:      General: No scleral icterus.  Cardiovascular:      Rate and Rhythm: Normal rate and regular rhythm.      Heart sounds: No murmur heard.  Pulmonary:      Effort: No respiratory distress.      Breath sounds: Normal breath sounds.   Abdominal:      General: Bowel sounds are normal.      Palpations: Abdomen is soft.   Neurological:      Mental Status: He is alert.      Motor: Tremor present.   Psychiatric:         Mood and Affect: Mood is anxious.           Data:    EKG, Pathology, and Other Studies: I have personally reviewed pertinent reports.   and I have personally reviewed pertinent films in PACS  EKG: NSR    Lab Results:  CBC ETOH     Lab Results   Component Value Date    WBC 7.84 08/24/2024    RBC 4.01 08/24/2024    HGB 13.1 08/24/2024    HCT 40.2 08/24/2024     (H) 08/24/2024    MCH 32.7 08/24/2024    MCHC 32.6 08/24/2024    RDW 11.6 08/24/2024     08/24/2024    MPV 8.9 08/24/2024      Lab Results   Component Value Date    LACTICACID 1.7 09/19/2023      CMP UA         Component Value Date/Time    K 3.7 08/24/2024 1205    K 4.7 11/03/2023 1309     08/24/2024 1205    CL 97 (L) 11/03/2023 1309    CO2 29 08/24/2024 1205    CO2 31 11/03/2023 1309    BUN 18  "08/24/2024 1205    BUN 15 11/03/2023 1309    CREATININE 1.00 08/24/2024 1205    CREATININE 0.85 11/03/2023 1309         Component Value Date/Time    CALCIUM 10.1 08/24/2024 1205    CALCIUM 10.2 (H) 11/03/2023 1309    ALKPHOS 134 (H) 08/24/2024 1205    ALKPHOS 117 11/03/2023 1309    AST 23 08/24/2024 1205    AST 16 11/03/2023 1309    ALT 25 08/24/2024 1205    ALT 19 11/03/2023 1309      No results found for: \"CLARITYU\", \"COLORU\", \"SPECGRAV\", \"PHUR\", \"GLUCOSEU\", \"KETONESU\", \"BLOODU\", \"PROTEIN UA\", \"NITRITE\", \"BILIRUBINUR\", \"UROBILINOGEN\", \"LEUKOCYTESUR\", \"WBCUA\", \"RBCUA\", \"HYALINE\", \"BACTERIA\", \"EPIS\"     Liver Function Test: ASA     Lab Results   Component Value Date    TBILI 0.34 08/24/2024    TBILI 0.5 11/03/2023    BILIDIR 0.15 12/02/2020    ALKPHOS 134 (H) 08/24/2024    ALKPHOS 117 11/03/2023    AST 23 08/24/2024    AST 16 11/03/2023    ALT 25 08/24/2024    ALT 19 11/03/2023    TP 7.3 08/24/2024    TP 7.5 11/03/2023    ALB 4.7 08/24/2024    ALB 4.6 11/03/2023      No results found for: \"SALICYLATE\"   Troponin APAP     No results found for: \"TROPONINI\"   No results found for: \"ACTMNPHEN\"   VBG HCG     No results found for: \"PHVEN\", \"ALD7OMO\", \"PO2VEN\", \"DIM0HMQ\", \"BEVEN\", \"T3UUQGELS\", \"M4CZZRT\"   No results found for: \"HCGQUANT\"   ABG Urine Drug Screen     No results found for: \"PHART\", \"BOX6DHJ\", \"PO2ART\", \"CNP5OSB\", \"BEART\", \"R4XOMSZVU\", \"O2HGB\", \"SOURC\", \"PATRICA\", \"VTAC\", \"ACRATE\", \"INSPIREDAIR\", \"PEEP\"   Lab Results   Component Value Date    AMPMETHUR Negative 08/24/2024    BARBTUR Negative 08/24/2024    BDZUR Negative 08/24/2024    COCAINEUR Positive (A) 08/24/2024    METHADONEUR Negative 08/24/2024    OPIATEUR Negative 08/24/2024    PCPUR Negative 08/24/2024    THCUR Positive (A) 08/24/2024    OXYCODONEUR Negative 08/24/2024      Lactate INR     Lab Results   Component Value Date    LACTICACID 1.7 09/19/2023      Lab Results   Component Value Date    INR 0.97 09/19/2023      PTT Protime     Lab Results "   Component Value Date/Time    PTT 26 09/19/2023 06:50 PM        Lab Results   Component Value Date/Time    PROTIME 13.5 09/19/2023 06:50 PM              Imaging Studies: I have personally reviewed pertinent reports.        Counseling / Coordination of Care  Total floor / unit time spent today 30 minutes. Greater than 50% of total time was spent with the patient and / or family counseling and / or coordination of care.       Minutes of critical care time 32  -Critical care time was exclusive of separately billable procedures and teaching time.   -Critical care was necessary to treat or prevent imminent or life-threatening deterioration of the following condition: CNS failure/compromise, toxidrome (ethanol withdrawal),  withdrawal  -Critical care time was spent personally by me on the following activities as well as the above as per the course and rest of chart: obtaining history from patient/surrogate, development of a treatment plan, discussions with referring provider(s), evaluation of patient's response to the treatment, examination of the patient, performing treatments and interventions, re-evaluation of the patient's condition, review of old charts, ordering/interpreting laboratory studies, ordering/interpreting of radiographic studies.      ** Please Note: This note has been constructed using a voice recognition system. **    Statement Selected

## 2024-08-24 NOTE — ASSESSMENT & PLAN NOTE
Currently takes Adderall XL 20 mg   PDMP reviewed - Last filled 7/31/24  Will hold during acute withdrawal setting and then resume

## 2024-08-25 LAB
ALBUMIN SERPL BCG-MCNC: 4.1 G/DL (ref 3.5–5)
ALP SERPL-CCNC: 124 U/L (ref 34–104)
ALT SERPL W P-5'-P-CCNC: 19 U/L (ref 7–52)
ANION GAP SERPL CALCULATED.3IONS-SCNC: 12 MMOL/L (ref 4–13)
AST SERPL W P-5'-P-CCNC: 14 U/L (ref 13–39)
BILIRUB SERPL-MCNC: 0.6 MG/DL (ref 0.2–1)
BUN SERPL-MCNC: 17 MG/DL (ref 5–25)
CALCIUM SERPL-MCNC: 9.1 MG/DL (ref 8.4–10.2)
CHLORIDE SERPL-SCNC: 96 MMOL/L (ref 96–108)
CO2 SERPL-SCNC: 23 MMOL/L (ref 21–32)
CREAT SERPL-MCNC: 0.88 MG/DL (ref 0.6–1.3)
ERYTHROCYTE [DISTWIDTH] IN BLOOD BY AUTOMATED COUNT: 11.3 % (ref 11.6–15.1)
EST. AVERAGE GLUCOSE BLD GHB EST-MCNC: 194 MG/DL
GFR SERPL CREATININE-BSD FRML MDRD: 98 ML/MIN/1.73SQ M
GLUCOSE SERPL-MCNC: 157 MG/DL (ref 65–140)
GLUCOSE SERPL-MCNC: 189 MG/DL (ref 65–140)
GLUCOSE SERPL-MCNC: 343 MG/DL (ref 65–140)
GLUCOSE SERPL-MCNC: 492 MG/DL (ref 65–140)
GLUCOSE SERPL-MCNC: 529 MG/DL (ref 65–140)
HBA1C MFR BLD: 8.4 %
HCT VFR BLD AUTO: 36.7 % (ref 36.5–49.3)
HGB BLD-MCNC: 12.5 G/DL (ref 12–17)
MAGNESIUM SERPL-MCNC: 1.8 MG/DL (ref 1.9–2.7)
MCH RBC QN AUTO: 33.2 PG (ref 26.8–34.3)
MCHC RBC AUTO-ENTMCNC: 34.1 G/DL (ref 31.4–37.4)
MCV RBC AUTO: 98 FL (ref 82–98)
PLATELET # BLD AUTO: 309 THOUSANDS/UL (ref 149–390)
PMV BLD AUTO: 9.6 FL (ref 8.9–12.7)
POTASSIUM SERPL-SCNC: 4.3 MMOL/L (ref 3.5–5.3)
PROT SERPL-MCNC: 6.4 G/DL (ref 6.4–8.4)
RBC # BLD AUTO: 3.76 MILLION/UL (ref 3.88–5.62)
SODIUM SERPL-SCNC: 131 MMOL/L (ref 135–147)
WBC # BLD AUTO: 6.82 THOUSAND/UL (ref 4.31–10.16)

## 2024-08-25 PROCEDURE — 82948 REAGENT STRIP/BLOOD GLUCOSE: CPT

## 2024-08-25 PROCEDURE — 80053 COMPREHEN METABOLIC PANEL: CPT

## 2024-08-25 PROCEDURE — 83735 ASSAY OF MAGNESIUM: CPT

## 2024-08-25 PROCEDURE — 85027 COMPLETE CBC AUTOMATED: CPT

## 2024-08-25 RX ORDER — KETOROLAC TROMETHAMINE 30 MG/ML
30 INJECTION, SOLUTION INTRAMUSCULAR; INTRAVENOUS EVERY 6 HOURS PRN
Status: DISCONTINUED | OUTPATIENT
Start: 2024-08-25 | End: 2024-08-27

## 2024-08-25 RX ORDER — INSULIN LISPRO 100 [IU]/ML
2-12 INJECTION, SOLUTION INTRAVENOUS; SUBCUTANEOUS
Status: DISCONTINUED | OUTPATIENT
Start: 2024-08-25 | End: 2024-08-26

## 2024-08-25 RX ORDER — INSULIN LISPRO 100 [IU]/ML
5 INJECTION, SOLUTION INTRAVENOUS; SUBCUTANEOUS ONCE
Status: COMPLETED | OUTPATIENT
Start: 2024-08-25 | End: 2024-08-25

## 2024-08-25 RX ORDER — INSULIN GLARGINE 100 [IU]/ML
5 INJECTION, SOLUTION SUBCUTANEOUS EVERY MORNING
Status: DISCONTINUED | OUTPATIENT
Start: 2024-08-25 | End: 2024-08-26

## 2024-08-25 RX ORDER — INSULIN LISPRO 100 [IU]/ML
12 INJECTION, SOLUTION INTRAVENOUS; SUBCUTANEOUS
Status: DISCONTINUED | OUTPATIENT
Start: 2024-08-26 | End: 2024-08-26

## 2024-08-25 RX ORDER — AMOXICILLIN 250 MG
1 CAPSULE ORAL
Status: DISCONTINUED | OUTPATIENT
Start: 2024-08-25 | End: 2024-08-27 | Stop reason: HOSPADM

## 2024-08-25 RX ORDER — ZOLPIDEM TARTRATE 5 MG/1
10 TABLET ORAL
Status: DISCONTINUED | OUTPATIENT
Start: 2024-08-25 | End: 2024-08-27 | Stop reason: HOSPADM

## 2024-08-25 RX ORDER — NICOTINE 21 MG/24HR
21 PATCH, TRANSDERMAL 24 HOURS TRANSDERMAL DAILY
Status: DISCONTINUED | OUTPATIENT
Start: 2024-08-25 | End: 2024-08-26

## 2024-08-25 RX ORDER — MAGNESIUM SULFATE HEPTAHYDRATE 40 MG/ML
2 INJECTION, SOLUTION INTRAVENOUS ONCE
Status: COMPLETED | OUTPATIENT
Start: 2024-08-25 | End: 2024-08-25

## 2024-08-25 RX ORDER — NICOTINE 21 MG/24HR
21 PATCH, TRANSDERMAL 24 HOURS TRANSDERMAL ONCE
Status: COMPLETED | OUTPATIENT
Start: 2024-08-25 | End: 2024-08-26

## 2024-08-25 RX ORDER — PHENOBARBITAL SODIUM 130 MG/ML
130 INJECTION, SOLUTION INTRAMUSCULAR; INTRAVENOUS ONCE
Status: COMPLETED | OUTPATIENT
Start: 2024-08-25 | End: 2024-08-25

## 2024-08-25 RX ORDER — PHENOBARBITAL SODIUM 130 MG/ML
260 INJECTION, SOLUTION INTRAMUSCULAR; INTRAVENOUS ONCE
Status: COMPLETED | OUTPATIENT
Start: 2024-08-25 | End: 2024-08-25

## 2024-08-25 RX ADMIN — NICOTINE 21 MG: 21 PATCH, EXTENDED RELEASE TRANSDERMAL at 15:20

## 2024-08-25 RX ADMIN — INSULIN LISPRO 10 UNITS: 100 INJECTION, SOLUTION INTRAVENOUS; SUBCUTANEOUS at 08:23

## 2024-08-25 RX ADMIN — PREGABALIN 25 MG: 25 CAPSULE ORAL at 17:04

## 2024-08-25 RX ADMIN — INSULIN LISPRO 2 UNITS: 100 INJECTION, SOLUTION INTRAVENOUS; SUBCUTANEOUS at 11:37

## 2024-08-25 RX ADMIN — ATORVASTATIN CALCIUM 40 MG: 40 TABLET, FILM COATED ORAL at 08:22

## 2024-08-25 RX ADMIN — CEPHALEXIN 500 MG: 500 CAPSULE ORAL at 05:06

## 2024-08-25 RX ADMIN — FOLIC ACID: 5 INJECTION, SOLUTION INTRAMUSCULAR; INTRAVENOUS; SUBCUTANEOUS at 08:23

## 2024-08-25 RX ADMIN — TRAZODONE HYDROCHLORIDE 50 MG: 50 TABLET ORAL at 00:14

## 2024-08-25 RX ADMIN — INSULIN LISPRO 10 UNITS: 100 INJECTION, SOLUTION INTRAVENOUS; SUBCUTANEOUS at 16:47

## 2024-08-25 RX ADMIN — INSULIN LISPRO 10 UNITS: 100 INJECTION, SOLUTION INTRAVENOUS; SUBCUTANEOUS at 11:37

## 2024-08-25 RX ADMIN — KETOROLAC TROMETHAMINE 30 MG: 30 INJECTION, SOLUTION INTRAMUSCULAR; INTRAVENOUS at 21:50

## 2024-08-25 RX ADMIN — PHENOBARBITAL SODIUM 130 MG: 130 INJECTION INTRAMUSCULAR; INTRAVENOUS at 15:46

## 2024-08-25 RX ADMIN — SENNOSIDES AND DOCUSATE SODIUM 1 TABLET: 8.6; 5 TABLET ORAL at 21:50

## 2024-08-25 RX ADMIN — PHENOBARBITAL SODIUM 260 MG: 130 INJECTION INTRAMUSCULAR; INTRAVENOUS at 00:14

## 2024-08-25 RX ADMIN — DIPHENHYDRAMINE HYDROCHLORIDE AND LIDOCAINE HYDROCHLORIDE AND ALUMINUM HYDROXIDE AND MAGNESIUM HYDRO 10 ML: KIT at 15:47

## 2024-08-25 RX ADMIN — INSULIN GLARGINE 5 UNITS: 100 INJECTION, SOLUTION SUBCUTANEOUS at 08:23

## 2024-08-25 RX ADMIN — PANTOPRAZOLE SODIUM 40 MG: 40 TABLET, DELAYED RELEASE ORAL at 08:22

## 2024-08-25 RX ADMIN — NICOTINE 21 MG: 21 PATCH, EXTENDED RELEASE TRANSDERMAL at 00:22

## 2024-08-25 RX ADMIN — INSULIN LISPRO 2 UNITS: 100 INJECTION, SOLUTION INTRAVENOUS; SUBCUTANEOUS at 16:47

## 2024-08-25 RX ADMIN — INSULIN LISPRO 5 UNITS: 100 INJECTION, SOLUTION INTRAVENOUS; SUBCUTANEOUS at 04:39

## 2024-08-25 RX ADMIN — VORTIOXETINE 20 MG: 20 TABLET, FILM COATED ORAL at 16:46

## 2024-08-25 RX ADMIN — HYDROXYZINE HYDROCHLORIDE 50 MG: 50 TABLET, FILM COATED ORAL at 17:03

## 2024-08-25 RX ADMIN — TRAZODONE HYDROCHLORIDE 50 MG: 50 TABLET ORAL at 21:50

## 2024-08-25 RX ADMIN — NICOTINE 21 MG: 21 PATCH, EXTENDED RELEASE TRANSDERMAL at 08:27

## 2024-08-25 RX ADMIN — MAGNESIUM SULFATE HEPTAHYDRATE 2 G: 2 INJECTION, SOLUTION INTRAVENOUS at 08:59

## 2024-08-25 RX ADMIN — KETOROLAC TROMETHAMINE 30 MG: 30 INJECTION, SOLUTION INTRAMUSCULAR; INTRAVENOUS at 15:46

## 2024-08-25 RX ADMIN — PREGABALIN 25 MG: 25 CAPSULE ORAL at 08:22

## 2024-08-25 RX ADMIN — PHENOBARBITAL SODIUM 130 MG: 130 INJECTION INTRAMUSCULAR; INTRAVENOUS at 04:40

## 2024-08-25 RX ADMIN — INSULIN LISPRO 3 UNITS: 100 INJECTION, SOLUTION INTRAVENOUS; SUBCUTANEOUS at 08:24

## 2024-08-25 NOTE — QUICK NOTE
Notified by RN of patient feeling like his blood sugar is high. PCA performed fingerstick blood glucose which resulted 529. Per SSI guidelines for pt's weight, will administer insulin lispro 5 units as one time dose at this time for blood glucose >450. Plan to re-check blood glucose in approximately 2-2.5 hours prior to breakfast time insulin dose.

## 2024-08-25 NOTE — PROGRESS NOTES
Progress Note - Medical Toxicology    Cesar Theodore 54 y.o. male MRN: [id number]  Unit/Bed#: 5T [facility] 518-01 Encounter: [id number]  MEDICAL DETOX UNIT, LEVEL 4  Department of Medical Toxicology  Reason for Admission/Principal Problem: alcohol withdrawal   Rounding Provider: Lindsay Mccarty PA-C, Francis Duong [facility]*         * Alcohol withdrawal syndrome with complication (HCC)  Assessment & Plan  H/o chronic daily alcohol consumption, denies h/o withdrawal seizures  Last drink: 8/23/24  Ethanol lvl: < 10   Continue SEWS protocol with symptom-triggered phenobarbital for medically-assisted alcohol withdrawal  Current symptoms include anxiety, tremors  Received a total of 910 mg of phenobarbital without complication   Initiate telemetry and pulse oximetry   Continue to monitor vitals, symptoms        Alcohol use disorder, severe, dependence (HCC)  Assessment & Plan  Pt with a h/o chronic heavy alcohol use   Drinks 12 pack of beer  daily   Denies H/o withdrawal seizures  Interested naltrexone at this time  Withdrawal management as above  Initiate IVFs, IV thiamine, folic acid, and MVI  Consult case management/CRS for assistance with aftercare resources - pt interested in inpatient drug and alcohol at this time     DM I (diabetes mellitus, type I) ([facility])  Assessment & Plan  Lab Results   Component Value Date    HGBA1C 8.4 (H) 08/24/2024       Recent Labs     08/24/24  1553 08/24/24  2049 08/25/24  0431 08/25/24  0630   POCGLU 517* 278* 529* 343*       Blood Sugar Average: Last 72 hrs:  (P) 358.6    Hgb A1c 8.4  Patient reports non-compliance with current medication. He states that he only takes insulin 10 units with meals.  Chart review  preformed- patient is prescribed Lantus 30 U daily, and insulin aspart 30 units with meals   Poorly controlled blood sugars- patient not adhering to diet  Initiate Lantus 5 U daily   Continue Insulin Lispro 10 units with meals  Algorithm 4 SSI   Continue to adjust insulin  as needed for better control   Hypoglycemic protocol   Diabetic Diet       Tobacco use disorder  Assessment & Plan  I personally provided 7 minutes of tobacco cessation education  Patient is interested in NRT with nicotine patch     ADHD  Assessment & Plan  Currently takes Adderall XL 20 mg   PDMP reviewed - Last filled 7/31/24  Will hold during acute withdrawal setting and then resume     Cocaine use disorder (HCC)  Assessment & Plan  Intermittent cocaine use   Last use three days ago   Denies any current chest pain   Encourage Cessation     Anxiety  Assessment & Plan  Continue Atarax 50 mg q6 hours prn   Currently on Wellbutrin - will also hold during hospitalization, patient can resume upon discharge     HTN (hypertension)  Assessment & Plan  Patient with a history of HTN  Home medication regimen: Lisinopril 5 mg   BP hypertensive upon arrival to the unit in the setting of acute alcohol withdrawal and chronic HTN  Most Recent 140/80  Will hold during acute withdrawal   Plan to resume anti-hypertensive therapy if BP remains high after resolution of acute withdrawal  Continue monitoring BP     Hyperlipidemia  Assessment & Plan  Continue atorvastatin     Gastroesophageal reflux disease without esophagitis  Assessment & Plan  Continue protonix             VTE Pharmacologic Prophylaxis:   Pharmacologic: Enoxaparin (Lovenox)  Mechanical VTE Prophylaxis in Place: yes    Code Status: Level 1 - Full Code    Patient Centered Rounds: I have performed bedside rounds with nursing staff today.    Discussions with Specialists or Other Care Team Provider: Case Management    Education and Discussions with Family / Patient: I personally     Time Spent for Care: 30 minutes.  More than 50% of total time spent on counseling and coordination of care as described above.    Current Length of Stay: 1 day(s)    Current Patient Status: Inpatient     Certification Statement: The patient will continue to require additional inpatient hospital  stay due to on SEWS protocol   Discharge Plan: Anticipated Discharge within 24-48 hours       Subjective:   Patient seen and examined at bedside. Continues to report mild withdrawal symptoms, however he just woke up from sleep. Discussed his poorly controlled sugars and adhering to his diet as patient had outside snacks present in his room.     Denies chest pain, headache, shortness of breath, abdominal pain.     Objective:     Clinical Opiate Withdrawal Scale  Pulse: 78    SEWS Total Score: 10 (8/25/2024  4:29 AM)        Last 24 Hours Medication List:   Current Facility-Administered Medications   Medication Dose Route Frequency Provider Last Rate    acetaminophen  650 mg Oral Q6H PRN Lindsay Mccarty PA-C      acetaminophen  650 mg Oral Q6H PRN Lindsay Mccarty PA-C      atorvastatin  40 mg Oral Daily Lindsay Mccarty PA-C      cephalexin  500 mg Oral Q8H [facility] Starla Iniguez PA-C      diphenhydramine, lidocaine, Al/Mg hydroxide, simethicone  10 mL Swish & Spit Q4H PRN Starla Iniguez PA-C      enoxaparin  40 mg Subcutaneous Daily Lindsay Mccarty PA-C      folic acid 1 mg, thiamine (VITAMIN B1) 100 mg in sodium chloride 0.9 % 100 mL IV piggyback   Intravenous Daily Lindsay Mccarty PA-C      hydrOXYzine HCL  50 mg Oral BID PRN Lindsay Mccarty PA-C      insulin glargine  5 Units Subcutaneous QAM Lindsay Mccarty PA-C      insulin lispro  10 Units Subcutaneous Daily With Breakfast Lindsay Mccarty PA-C      insulin lispro  10 Units Subcutaneous Daily With Lunch Lindsay Mccarty PA-C      insulin lispro  10 Units Subcutaneous Daily With Dinner Lindsay Mccarty PA-C      insulin lispro  2-12 Units Subcutaneous TID AC Lindsay Mccarty PA-C      magnesium sulfate  2 g Intravenous Once Lindsay Mccarty PA-C 2 g (08/25/24 0859)    nicotine  21 mg Transdermal Daily Starla Iniguez PA-C      ondansetron  4 mg Intravenous Q6H PRN  Lindsayeliza Mccarty PA-C      pantoprazole  40 mg Oral Daily Lindsayeliza Mccarty PA-C      pregabalin  25 mg Oral BID Lindsay Mccarty PA-C      sodium chloride  100 mL/hr Intravenous Continuous Lindsay Mccarty PA-C 100 mL/hr (24 1422)    traZODone  50 mg Oral HS PRN Lindsay Mccarty PA-C           Vitals:   Temp (24hrs), Av.7 °F (36.5 °C), Min:97.2 °F (36.2 °C), Max:98.2 °F (36.8 °C)    Temp:  [97.2 °F (36.2 °C)-98.2 °F (36.8 °C)] 97.2 °F (36.2 °C)  HR:  [74-94] 78  Resp:  [16-18] 18  BP: (112-164)/(66-92) 112/66  SpO2:  [97 %-99 %] 98 %  Body mass index is 22.35 kg/m².     Input and Output Summary (last 24 hours):No intake or output data in the 24 hours ending 24 0935    Physical Exam:   Physical Exam  Vitals and nursing note reviewed.   Constitutional:       General: He is not in acute distress.     Appearance: He is not diaphoretic.   Eyes:      General: No scleral icterus.     Pupils: Pupils are equal, round, and reactive to light.   Cardiovascular:      Rate and Rhythm: Normal rate and regular rhythm.   Neurological:      Mental Status: He is alert and oriented to person, place, and time.      Motor: No tremor.   Psychiatric:         Mood and Affect: Mood is anxious. Mood is not depressed.         Additional Data:     Labs: keep all most recent labs as listed on admission templates   Results from last 7 days   Lab Units 24  0436 24  1205   WBC Thousand/uL 6.82 7.84   HEMOGLOBIN g/dL 12.5 13.1   HEMATOCRIT % 36.7 40.2   PLATELETS Thousands/uL 309 331   SEGS PCT %  --  46   LYMPHO PCT %  --  38   MONO PCT %  --  9   EOS PCT %  --  6      Results from last 7 days   Lab Units 24  0436   SODIUM mmol/L 131*   POTASSIUM mmol/L 4.3   CHLORIDE mmol/L 96   CO2 mmol/L 23   BUN mg/dL 17   CREATININE mg/dL 0.88   ANION GAP mmol/L 12   CALCIUM mg/dL 9.1   ALBUMIN g/dL 4.1   TOTAL BILIRUBIN mg/dL 0.60   ALK PHOS U/L 124*   ALT U/L 19   AST U/L 14   GLUCOSE RANDOM  mg/dL 492*           Results from last 7 days   Lab Units 08/25/24  0630 08/25/24  0431 08/24/24  2049 08/24/24  1553 08/24/24  1214   POC GLUCOSE mg/dl 343* 529* 278* 517* 126      Results from last 7 days   Lab Units 08/24/24  1205   HEMOGLOBIN A1C % 8.4*               * I Have Reviewed All Lab Data Listed Above.  * Additional Pertinent Lab Tests Reviewed: All Labs For Current Hospital Admission Reviewed      Imaging Studies: I have personally reviewed pertinent reports.        Recent Cultures (last 7 days):          Today, Patient Was Seen By: Lindsay Mccarty PA-C    ** Please Note: Dictation voice to text software may have been used in the creation of this document. **

## 2024-08-26 LAB
GLUCOSE SERPL-MCNC: 105 MG/DL (ref 65–140)
GLUCOSE SERPL-MCNC: 176 MG/DL (ref 65–140)
GLUCOSE SERPL-MCNC: 319 MG/DL (ref 65–140)
GLUCOSE SERPL-MCNC: 417 MG/DL (ref 65–140)
GLUCOSE SERPL-MCNC: 456 MG/DL (ref 65–140)
GLUCOSE SERPL-MCNC: 472 MG/DL (ref 65–140)
GLUCOSE SERPL-MCNC: 81 MG/DL (ref 65–140)

## 2024-08-26 PROCEDURE — 82948 REAGENT STRIP/BLOOD GLUCOSE: CPT

## 2024-08-26 PROCEDURE — 99222 1ST HOSP IP/OBS MODERATE 55: CPT | Performed by: INTERNAL MEDICINE

## 2024-08-26 RX ORDER — CHLORDIAZEPOXIDE HYDROCHLORIDE 25 MG/1
25 CAPSULE, GELATIN COATED ORAL EVERY 8 HOURS SCHEDULED
Status: DISCONTINUED | OUTPATIENT
Start: 2024-08-26 | End: 2024-08-27

## 2024-08-26 RX ORDER — INSULIN LISPRO 100 [IU]/ML
12 INJECTION, SOLUTION INTRAVENOUS; SUBCUTANEOUS ONCE
Status: DISCONTINUED | OUTPATIENT
Start: 2024-08-26 | End: 2024-08-26

## 2024-08-26 RX ORDER — INSULIN LISPRO 100 [IU]/ML
2-12 INJECTION, SOLUTION INTRAVENOUS; SUBCUTANEOUS
Status: DISCONTINUED | OUTPATIENT
Start: 2024-08-26 | End: 2024-08-27 | Stop reason: HOSPADM

## 2024-08-26 RX ORDER — CHLORDIAZEPOXIDE HYDROCHLORIDE 25 MG/1
25 CAPSULE, GELATIN COATED ORAL EVERY 8 HOURS SCHEDULED
Status: DISCONTINUED | OUTPATIENT
Start: 2024-08-26 | End: 2024-08-26

## 2024-08-26 RX ORDER — INSULIN LISPRO 100 [IU]/ML
10 INJECTION, SOLUTION INTRAVENOUS; SUBCUTANEOUS ONCE
Status: DISCONTINUED | OUTPATIENT
Start: 2024-08-26 | End: 2024-08-26

## 2024-08-26 RX ORDER — LANOLIN ALCOHOL/MO/W.PET/CERES
100 CREAM (GRAM) TOPICAL DAILY
Status: DISCONTINUED | OUTPATIENT
Start: 2024-08-26 | End: 2024-08-27 | Stop reason: HOSPADM

## 2024-08-26 RX ORDER — INSULIN GLARGINE 100 [IU]/ML
15 INJECTION, SOLUTION SUBCUTANEOUS
Status: DISCONTINUED | OUTPATIENT
Start: 2024-08-26 | End: 2024-08-27 | Stop reason: HOSPADM

## 2024-08-26 RX ORDER — FOLIC ACID 1 MG/1
1 TABLET ORAL DAILY
Status: DISCONTINUED | OUTPATIENT
Start: 2024-08-27 | End: 2024-08-27 | Stop reason: HOSPADM

## 2024-08-26 RX ORDER — INSULIN LISPRO 100 [IU]/ML
8 INJECTION, SOLUTION INTRAVENOUS; SUBCUTANEOUS
Status: DISCONTINUED | OUTPATIENT
Start: 2024-08-27 | End: 2024-08-27

## 2024-08-26 RX ORDER — INSULIN GLARGINE 100 [IU]/ML
15 INJECTION, SOLUTION SUBCUTANEOUS EVERY MORNING
Status: DISCONTINUED | OUTPATIENT
Start: 2024-08-27 | End: 2024-08-26

## 2024-08-26 RX ORDER — NICOTINE 21 MG/24HR
21 PATCH, TRANSDERMAL 24 HOURS TRANSDERMAL DAILY
Status: DISCONTINUED | OUTPATIENT
Start: 2024-08-26 | End: 2024-08-27

## 2024-08-26 RX ORDER — INSULIN LISPRO 100 [IU]/ML
10 INJECTION, SOLUTION INTRAVENOUS; SUBCUTANEOUS
Status: DISCONTINUED | OUTPATIENT
Start: 2024-08-27 | End: 2024-08-26

## 2024-08-26 RX ADMIN — NICOTINE 21 MG: 21 PATCH, EXTENDED RELEASE TRANSDERMAL at 08:48

## 2024-08-26 RX ADMIN — ZOLPIDEM TARTRATE 10 MG: 5 TABLET ORAL at 21:30

## 2024-08-26 RX ADMIN — INSULIN LISPRO 12 UNITS: 100 INJECTION, SOLUTION INTRAVENOUS; SUBCUTANEOUS at 21:37

## 2024-08-26 RX ADMIN — INSULIN GLARGINE 15 UNITS: 100 INJECTION, SOLUTION SUBCUTANEOUS at 21:35

## 2024-08-26 RX ADMIN — CHLORDIAZEPOXIDE HYDROCHLORIDE 25 MG: 25 CAPSULE ORAL at 18:12

## 2024-08-26 RX ADMIN — KETOROLAC TROMETHAMINE 30 MG: 30 INJECTION, SOLUTION INTRAMUSCULAR; INTRAVENOUS at 09:09

## 2024-08-26 RX ADMIN — ZOLPIDEM TARTRATE 10 MG: 5 TABLET ORAL at 00:38

## 2024-08-26 RX ADMIN — VORTIOXETINE 20 MG: 20 TABLET, FILM COATED ORAL at 08:47

## 2024-08-26 RX ADMIN — INSULIN LISPRO 10 UNITS: 100 INJECTION, SOLUTION INTRAVENOUS; SUBCUTANEOUS at 08:44

## 2024-08-26 RX ADMIN — INSULIN LISPRO 12 UNITS: 100 INJECTION, SOLUTION INTRAVENOUS; SUBCUTANEOUS at 12:06

## 2024-08-26 RX ADMIN — PREGABALIN 25 MG: 25 CAPSULE ORAL at 08:47

## 2024-08-26 RX ADMIN — INSULIN LISPRO 12 UNITS: 100 INJECTION, SOLUTION INTRAVENOUS; SUBCUTANEOUS at 08:44

## 2024-08-26 RX ADMIN — NICOTINE 21 MG: 21 PATCH, EXTENDED RELEASE TRANSDERMAL at 17:56

## 2024-08-26 RX ADMIN — PANTOPRAZOLE SODIUM 40 MG: 40 TABLET, DELAYED RELEASE ORAL at 08:47

## 2024-08-26 RX ADMIN — INSULIN LISPRO 8 UNITS: 100 INJECTION, SOLUTION INTRAVENOUS; SUBCUTANEOUS at 12:06

## 2024-08-26 RX ADMIN — HYDROXYZINE HYDROCHLORIDE 50 MG: 50 TABLET, FILM COATED ORAL at 09:09

## 2024-08-26 RX ADMIN — TRAZODONE HYDROCHLORIDE 50 MG: 50 TABLET ORAL at 20:21

## 2024-08-26 RX ADMIN — HYDROXYZINE HYDROCHLORIDE 50 MG: 50 TABLET, FILM COATED ORAL at 20:21

## 2024-08-26 RX ADMIN — ATORVASTATIN CALCIUM 40 MG: 40 TABLET, FILM COATED ORAL at 08:47

## 2024-08-26 RX ADMIN — FOLIC ACID: 5 INJECTION, SOLUTION INTRAMUSCULAR; INTRAVENOUS; SUBCUTANEOUS at 08:48

## 2024-08-26 RX ADMIN — KETOROLAC TROMETHAMINE 30 MG: 30 INJECTION, SOLUTION INTRAMUSCULAR; INTRAVENOUS at 20:09

## 2024-08-26 RX ADMIN — PREGABALIN 25 MG: 25 CAPSULE ORAL at 17:56

## 2024-08-26 RX ADMIN — INSULIN GLARGINE 5 UNITS: 100 INJECTION, SOLUTION SUBCUTANEOUS at 08:43

## 2024-08-26 RX ADMIN — SENNOSIDES AND DOCUSATE SODIUM 1 TABLET: 8.6; 5 TABLET ORAL at 20:21

## 2024-08-26 NOTE — UTILIZATION REVIEW
Initial Clinical Review    Pt presented to [facility] for its Level IV medically managed intensive inpatient detox unit.    Admission: Date/Time/Statement:   Admission Orders (From admission, onward)       Ordered        08/24/24 1254  INPATIENT ADMISSION  Once                          Orders Placed This Encounter   Procedures    INPATIENT ADMISSION     Standing Status:   Standing     Number of Occurrences:   1     Order Specific Question:   Level of Care     Answer:   Med Surg [16]     Order Specific Question:   Estimated length of stay     Answer:   More than 2 Midnights     Order Specific Question:   Certification     Answer:   I certify that inpatient services are medically necessary for this patient for a duration of greater than two midnights. See H&P and MD Progress Notes for additional information about the patient's course of treatment.     ED Arrival Information       Expected   -    Arrival   8/24/2024 11:38    Acuity   Urgent              Means of arrival   Walk-In    Escorted by   Family Member    Service   Medical Toxicology    Admission type   Emergency              Arrival complaint   Detox             Chief Complaint   Patient presents with    Detox Evaluation     Pt arrives for detox for alcohol. Pt drinks 12 pack of beer/day. Last drink was last night around 11. Pt states he uses prescription adderall and marijuana, denies other street drugs. Denies SI.        Initial Presentation: 54 y.o. male who presented to medical detox. Inpatient admission for evaluation and treatment of alcohol withdrawal syndrome. PMHx: HTN, HLD, Type I DM, GERD, ADHD, and anxiety. Presented w/ need for detox from alcohol. Reports 12 pack of beer daily, last drink on 8/23 @ 2300. Has no prior rehab treatment for withdrawal. Chronic alcohol use for past 10 years. Notes only using 10 units insulin w/ meals, pt is prescribed 30 Lantus daily & 30 aspart w/ meals. On exam, anxiety, diaphoresis, nausea, tremors.  Serum ETOH < 10. UDS positive for cocaine and THC; pt notes intermittent cocaine use, last used 8/21. SEWS 8. Plan: SEWS monitoring w/ phenobarbital management, IV thiamine/folic acid supplement, IVF, telemetry, continuous pulse ox, continue PTA meds except Wellbutrin & Adderall, trend labs, replete electrolytes as needed. Check HgbA1c, SSI w/ BG checks ACHS, Lispro 10 units TID AC, nicotine patch.     Anticipated Length of Stay:  Patient will be admitted on an Inpatient basis with an anticipated length of stay of  >2  midnights.   This patient qualifies for Level IV medically managed intensive inpatient services under the criteria set by the American Society of Addiction Medicine, including dimensions 1-3. The patient is in withdrawal (or is intoxicated with high risk of withdrawal), with severe and unstable medical and/or psychiatric (dual diagnosis) problems, requiring requires 24-hour medical and nursing care and the full resources of a licensed hospital.        Date: 8/25       Day 2: Overnight, high BG noted at 529; given 5 units lispro. Educated on poorly controlled BG and need to adhere to medications and diet as outside snacks were present. Pt reports interest in starting Naltrexone and inpatient [facility] rehab once medically cleared. On exam, anxious, tremors, diaphoresis. SEWS 10. Plan: continue SEWS monitoring w/ phenobarbital management, IV thiamine/folic acid supplement, telemetry, continuous pulse ox, continue current meds, trend labs, replete electrolytes as needed. Pt has received 1 dose IV diazepam & 5 doses of IV phenobarbital since admission.      ED Triage Vitals   Temperature Pulse Respirations Blood Pressure SpO2 Pain Score   08/24/24 1150 08/24/24 1150 08/24/24 1150 08/24/24 1150 08/24/24 1150 08/24/24 1336   98.2 °F (36.8 °C) 93 18 150/80 99 % 10 - Worst Possible Pain     Weight (last 2 days)       Date/Time Weight    08/24/24 1336 66.7 (147)    08/24/24 1150 67 (147.8)            Vital Signs  (last 3 days)       Date/Time Temp Pulse Resp BP MAP (mmHg) SpO2 O2 Device Patient Position - Orthostatic VS [place] Coma Scale Score SEWS Total Score Pain    08/26/24 0755 97.6 °F (36.4 °C) 79 18 138/79 98 95 % None (Room air) Lying -- -- --    08/26/24 0100 -- -- -- -- -- -- -- -- 15 -- --    08/25/24 2317 -- -- -- -- -- -- -- -- -- 0 --    08/25/24 2305 96.6 °F (35.9 °C) 88 16 163/94 117 99 % None (Room air) Sitting -- -- --    08/25/24 2150 -- -- -- -- -- -- -- -- -- -- 10 - Worst Possible Pain    08/25/24 1930 -- -- -- -- -- -- -- -- -- 0 --    08/25/24 1924 97.1 °F (36.2 °C) 85 16 144/91 108 100 % None (Room air) Sitting -- -- 10 - Worst Possible Pain    08/25/24 1546 -- -- -- -- -- -- -- -- -- -- 5    08/25/24 1538 -- -- -- -- -- -- -- -- -- 8 --    08/25/24 1518 97.5 °F (36.4 °C) 82 16 143/88 106 99 % None (Room air) Sitting -- -- --    08/25/24 1130 -- -- -- -- -- -- -- -- -- 0 --    08/25/24 1116 97.8 °F (36.6 °C) 92 18 137/78 97 97 % None (Room air) Sitting -- -- --    08/25/24 0832 -- -- -- -- -- -- -- -- -- -- No Pain    08/25/24 0730 -- -- -- -- -- -- -- -- -- 0 --    08/25/24 0719 97.2 °F (36.2 °C) 78 18 112/66 81 98 % None (Room air) Lying -- -- --    08/25/24 0429 -- -- -- -- -- -- -- -- -- 10 --    08/25/24 0428 97.8 °F (36.6 °C) 82 18 125/74 -- 99 % None (Room air) Sitting -- -- --    08/24/24 2359 -- -- -- -- -- -- -- -- -- 11 --    08/24/24 2358 97.8 °F (36.6 °C) 74 18 164/92 -- 99 % None (Room air) Sitting -- -- --    08/24/24 2000 -- -- -- -- -- -- -- -- -- 8 --    08/24/24 1938 -- -- -- -- -- -- -- -- 15 -- No Pain    08/24/24 1933 97.5 °F (36.4 °C) 82 18 133/84 100 99 % None (Room air) Lying -- -- --    08/24/24 1536 97.5 °F (36.4 °C) 94 16 138/68 91 99 % None (Room air) Lying -- -- --    08/24/24 1500 -- -- -- -- -- -- -- -- -- 0 --    08/24/24 1344 -- -- -- -- -- -- -- -- -- 8 --    08/24/24 1336 97.8 °F (36.6 °C) 88 18 140/80 -- 97 % None (Room air) Sitting -- -- 10 - Worst Possible Pain     08/24/24 1255 -- -- -- -- -- -- -- -- 15 -- --    08/24/24 1150 98.2 °F (36.8 °C) 93 18 150/80 -- 99 % None (Room air) Sitting -- -- --             CIWA-Ar Score       Row Name 08/24/24 1300             CIWA-Ar    Nausea and Vomiting 0      Tactile Disturbances 0      Tremor 0      Auditory Disturbances 0      Paroxysmal Sweats 0      Visual Disturbances 0      Anxiety 0      Agitation 0      Orientation and Clouding of Sensorium 0                      Severity of Ethanol Withdrawal Scale (SEWS):   Row Name 08/25/24 2317 08/25/24 1930 08/25/24 1538 08/25/24 1130 08/25/24 0730   Severity of Ethanol Withdrawal Scale (SEWS)   ANXIETY: Do you feel that something bad is about to happen to you right now? 0  -DE 0  -DE 3  -BH 0  -BH 0  -BH   NAUSEA and DRY HEAVES or VOMITING? 0  -DE 0  -DE 0  -BH 0  -BH 0  -BH   SWEATING: (includes moist palms, sweating now)? Score 0 or 2 0  -DE 0  -DE 0  -BH 0  -BH 0  -BH   TREMOR: with arms extended eyes closed? 0  -DE 0  -DE 2  -BH 0  -BH 0  -BH   AGITATION: Fidgety, restless, pacing? 0  -DE 0  -DE 3  -BH 0  -BH 0  -BH   DISORIENTATION: 0  -DE 0  -DE 0  -BH 0  -BH 0  -BH   HALLUCINATIONS: 0  -DE 0  -DE 0  -BH 0  -BH 0  -BH   VITAL SIGNS: ANY (Pulse >110, Diastolic BP >90, Temp >99.6) 0  -DE 0  -DE 0  -BH 0  -BH 0  -BH   SEWS Total Score 0  -DE 0  -DE 8  -BH 0  -BH 0  -BH   [place] Agitation Sedation Scale (RASS)   [place] Agitation Sedation Scale (RASS) 0  -DE 0  -DE +1  -BH 0  -BH -1  -BH   Row Name 08/25/24 0429 08/24/24 2359 08/24/24 2000 08/24/24 1500 08/24/24 1344   Severity of Ethanol Withdrawal Scale (SEWS)   ANXIETY: Do you feel that something bad is about to happen to you right now? 3  -TO 3  -TO 3  -TO 0  -LL 3  -LL   NAUSEA and DRY HEAVES or VOMITING? 0  -TO 0  -TO 0  -TO 0  -LL 0  -LL   SWEATING: (includes moist palms, sweating now)? Score 0 or 2 2  -TO 0  -TO 0  -TO 0  -LL 0  -LL   TREMOR: with arms extended eyes closed? 2  -TO 2  -TO 2  -TO 0  -LL 2  -LL   AGITATION:  Fidgety, restless, pacing? 3  -TO 3  -TO 3  -TO 0  -LL 3  -LL   DISORIENTATION: 0  -TO 0  -TO 0  -TO 0  -LL 0  -LL   HALLUCINATIONS: 0  -TO 0  -TO 0  -TO 0  -LL 0  -LL   VITAL SIGNS: ANY (Pulse >110, Diastolic BP >90, Temp >99.6) 0  -TO 3  -TO 0  -TO 0  -LL 0  -LL   SEWS Total Score 10  -TO 11  -TO 8  -TO 0  -LL 8  -LL       Pertinent Labs/Diagnostic Test Results:   Cardiology:  ECG 12 lead   Final Result by Francis Acrher MD (08/24 1326)   Normal sinus rhythm   Normal ECG   When compared with ECG of 19-SEP-2023 19:43,   No significant change was found   Confirmed by Francis Archer ([id number]) on 8/24/2024 1:26:15 PM          Results from last 7 days   Lab Units 08/25/24  0436 08/24/24  1205   WBC Thousand/uL 6.82 7.84   HEMOGLOBIN g/dL 12.5 13.1   HEMATOCRIT % 36.7 40.2   PLATELETS Thousands/uL 309 331   TOTAL NEUT ABS Thousands/µL  --  3.59         Results from last 7 days   Lab Units 08/25/24  0436 08/24/24  1205   SODIUM mmol/L 131* 139   POTASSIUM mmol/L 4.3 3.7   CHLORIDE mmol/L 96 101   CO2 mmol/L 23 29   ANION GAP mmol/L 12 9   BUN mg/dL 17 18   CREATININE mg/dL 0.88 1.00   EGFR ml/min/1.73sq m 98 86   CALCIUM mg/dL 9.1 10.1   MAGNESIUM mg/dL 1.8* 1.9     Results from last 7 days   Lab Units 08/25/24  0436 08/24/24  1205   AST U/L 14 23   ALT U/L 19 25   ALK PHOS U/L 124* 134*   TOTAL PROTEIN g/dL 6.4 7.3   ALBUMIN g/dL 4.1 4.7   TOTAL BILIRUBIN mg/dL 0.60 0.34     Results from last 7 days   Lab Units 08/26/24  0759 08/26/24  0727 08/25/24  1631 08/25/24  1100 08/25/24  0630 08/25/24  0431 08/24/24  2049 08/24/24  1553 08/24/24  1214   POC GLUCOSE mg/dl 472* 456* 157* 189* 343* 529* 278* 517* 126     Results from last 7 days   Lab Units 08/25/24  0436 08/24/24  1205   GLUCOSE RANDOM mg/dL 492* 77         Results from last 7 days   Lab Units 08/24/24  1205   HEMOGLOBIN A1C % 8.4*   EAG mg/dl 194      Results from last 7 days   Lab Units 08/24/24  1220   AMPH/METH  Negative   BARBITURATE UR  Negative    BENZODIAZEPINE UR  Negative   COCAINE UR  Positive*   METHADONE URINE  Negative   OPIATE UR  Negative   PCP UR  Negative   THC UR  Positive*     Results from last 7 days   Lab Units 08/24/24  1205   ETHANOL LVL mg/dL <10              Past Medical History:   Diagnosis Date    Diabetes mellitus (HCC)     GERD (gastroesophageal reflux disease)     Hyperlipidemia     Hypertension      Present on Admission:   Alcohol withdrawal syndrome with complication (HCC)   Alcohol use disorder, severe, dependence ([facility])   DM I (diabetes mellitus, type I) ([facility])   Gastroesophageal reflux disease without esophagitis      Admitting Diagnosis: Persons encountering health services in other specified circumstances [Z76.89]  Alcohol use disorder [F10.90]  Age/Sex: 54 y.o. male  Admission Orders:  Consistent Carbohydrate Diet.  Blood glucose checks ACHS.  I&O. SCDs.  Fall & Seizure Precautions.  SEWS monitoring.  Telemetry & Continuous Pulse Ox.    Scheduled Medications:  atorvastatin, 40 mg, Oral, Daily  enoxaparin, 40 mg, Subcutaneous, Daily  folic acid 1 mg, thiamine (VITAMIN B1) 100 mg in sodium chloride 0.9 % 100 mL IV piggyback, , Intravenous, Daily  insulin glargine, 5 Units, Subcutaneous, QAM  insulin lispro, 10 Units, Subcutaneous, TID AC  insulin lispro, 2-12 Units, Subcutaneous, TID AC  nicotine, 21 mg, Transdermal, Daily  nicotine, 21 mg, Transdermal, Once  pantoprazole, 40 mg, Oral, Daily  pregabalin, 25 mg, Oral, BID  senna-docusate sodium, 1 tablet, Oral, HS  Vortioxetine HBr, 20 mg, Oral, Daily    Continuous IV Infusions: none    PRN Meds:  acetaminophen, 650 mg, Oral, Q6H PRN  acetaminophen, 650 mg, Oral, Q6H PRN  diphenhydramine, lidocaine, Al/Mg hydroxide, simethicone, 10 mL, Swish & Spit, Q4H PRN; 8/24 x1, 8/25 x1  hydrOXYzine HCL, 50 mg, Oral, BID PRN; 8/25 x1  ketorolac, 30 mg, Intravenous, Q6H PRN; 8/25 x2  nicotine polacrilex, 2 mg, Oral, Q2H PRN  ondansetron, 4 mg, Intravenous, Q6H PRN  traZODone, 50 mg, Oral, HS  PRN; 8/25 x2  zolpidem, 10 mg, Oral, HS PRN  diazepam, 10 mg, Intravenous; 8/24 x1  phenobarbital, 260 mg, Intravenous; 8/24 x2, 8/25 x1  phenobarbital, 130 mg, Intravenous; 8/25 x2    insulin lispro (HumALOG/ADMELOG) 100 units/mL subcutaneous injection 5 Units  Dose: 5 Units  Freq: Once Route: SC  Indications of Use: HYPERGLYCEMIA  Start: 08/25/24 0445 End: 08/25/24 0439  magnesium sulfate 2 g/50 mL IVPB (premix) 2 g  Dose: 2 g  Freq: Once Route: IV  Last Dose: 2 g (08/25/24 0859)  Start: 08/25/24 0845 End: 08/25/24 1059    IP CONSULT TO CASE MANAGEMENT    Network Utilization Review Department  ATTENTION: Please call with any questions or concerns to [phone] and carefully listen to the prompts so that you are directed to the right person. All voicemails are confidential.   For Discharge needs, contact Care Management DC Support Team at [phone] opt. 2  Send all requests for admission clinical reviews, approved or denied determinations and any other requests to dedicated fax number below belonging to the campus where the patient is receiving treatment. List of dedicated fax numbers for the Facilities:  FACILITY NAME UR FAX NUMBER   ADMISSION DENIALS (Administrative/Medical Necessity) [phone]   DISCHARGE SUPPORT TEAM (NETWORK) [phone]   PARENT CHILD HEALTH (Maternity/NICU/Pediatrics) [phone]   [facility] [phone]   [facility] [phone]   [facility] [phone]   [facility] [phone]   [facility] [phone]   [facility] [phone]   [facility] [phone]   [facility] [phone]   [facility] [phone]   [facility] [phone]   [facility] [phone]   [facility]  [facility] [phone]

## 2024-08-26 NOTE — ASSESSMENT & PLAN NOTE
Patient with a history of HTN  Home medication regimen: Lisinopril 5 mg   BP hypertensive upon arrival to the unit in the setting of acute alcohol withdrawal and chronic HTN  Most Recent 147/90  Will hold during acute withdrawal   Plan to resume anti-hypertensive therapy if BP remains high after resolution of acute withdrawal  Continue monitoring BP

## 2024-08-26 NOTE — ASSESSMENT & PLAN NOTE
Lab Results   Component Value Date    HGBA1C 8.4 (H) 08/24/2024       Recent Labs     08/26/24  0727 08/26/24  0759 08/26/24  1136 08/26/24  1514   POCGLU 456* 472* 319* 81         Blood Sugar Average: Last 72 hrs:  (P) [id number]    Hgb A1c 8.4  Patient reports non-compliance with current medication. He states that he only takes insulin 10 units with meals.  Chart review  preformed- patient is prescribed Lantus 30 U daily, and insulin aspart 30 units with meals   Poorly controlled blood sugars- patient not adhering to diet  Initiate Lantus 5 U daily   Increased Insulin Lispro 12 units with meals  Algorithm 4 SSI   Continue to adjust insulin as needed for better control   Hypoglycemic protocol   Diabetic Diet

## 2024-08-26 NOTE — PROGRESS NOTES
[facility]  Progress Note  Name: Cesar Theodore I  MRN: [id number]  Unit/Bed#: 5T DETOX 518-01 I Date of Admission: 8/24/2024   Date of Service: 8/26/2024 I Hospital Day: 2    Assessment & Plan   Alcohol use disorder, severe, dependence (HCC)  Assessment & Plan  Pt with a h/o chronic heavy alcohol use   Drinks 12 pack of beer  daily   Denies H/o withdrawal seizures  Interested naltrexone at this time  Withdrawal management as above  Initiate IVFs, IV thiamine, folic acid, and MVI  Consult case management/CRS for assistance with aftercare resources - pt interested in inpatient drug and alcohol at this time     * Alcohol withdrawal syndrome with complication (HCC)  Assessment & Plan  H/o chronic daily alcohol consumption, denies h/o withdrawal seizures  Last drink: 8/23/24  Ethanol lvl: < 10   Continue SEWS protocol with symptom-triggered phenobarbital for medically-assisted alcohol withdrawal  Current symptoms include anxiety, tremors  Received a total of 910 mg of phenobarbital without complication   Initiate telemetry and pulse oximetry   Continue to monitor vitals, symptoms        Tobacco use disorder  Assessment & Plan  Patient is interested in NRT with nicotine patch     ADHD  Assessment & Plan  Currently takes Adderall XL 20 mg   PDMP reviewed - Last filled 7/31/24  Will hold during acute withdrawal setting and then resume     Cocaine use disorder (HCC)  Assessment & Plan  Intermittent cocaine use   Last use three days ago   Denies any current chest pain   Encourage Cessation     Anxiety  Assessment & Plan  Continue Atarax 50 mg q6 hours prn   Currently on Wellbutrin - will also hold during hospitalization, patient can resume upon discharge     HTN (hypertension)  Assessment & Plan  Patient with a history of HTN  Home medication regimen: Lisinopril 5 mg   BP hypertensive upon arrival to the unit in the setting of acute alcohol withdrawal and chronic HTN  Most Recent 147/90  Will  hold during acute withdrawal   Plan to resume anti-hypertensive therapy if BP remains high after resolution of acute withdrawal  Continue monitoring BP     Hyperlipidemia  Assessment & Plan  Continue atorvastatin 40 mg PO Qday    DM I (diabetes mellitus, type I) ([facility])  Assessment & Plan  Lab Results   Component Value Date    HGBA1C 8.4 (H) 08/24/2024       Recent Labs     08/26/24  0727 08/26/24  0759 08/26/24  1136 08/26/24  1514   POCGLU 456* 472* 319* 81         Blood Sugar Average: Last 72 hrs:  (P) [id number]    Hgb A1c 8.4  Patient reports non-compliance with current medication. He states that he only takes insulin 10 units with meals.  Chart review  preformed- patient is prescribed Lantus 30 U daily, and insulin aspart 30 units with meals   Poorly controlled blood sugars- patient not adhering to diet  Initiate Lantus 5 U daily   Increased Insulin Lispro 12 units with meals  Algorithm 4 SSI   Continue to adjust insulin as needed for better control   Hypoglycemic protocol   Diabetic Diet   Will consult [facility] for help with managing his Diabetes      Gastroesophageal reflux disease without esophagitis  Assessment & Plan  Continue Protonix 40 mg PO Qday       Subjective/Objective     Subjective:   Cesar Theodore is a 54 y.o. year old male with a past medical history of HTN, HLD, Type I DM, GERD, ADHD, and anxiety. Patient's alcohol withdrawal seeking detoxification. Patient initially presented to the [facility] ED requesting alcohol detox with sx of HTN, anxiety, and tremors and was subsequently transferred to the [facility] medical detox unit for medical management of alcohol withdrawal. Pt reports drinking 12 pack of beer daily, and his last drink was 8/23/24. Serum alcohol was < 10  in the ED. Admits to a history of chronic alcohol use for the last 10 years. Denies any history of alcohol withdrawal seizures. At this time patient reports he feels withdrawal symptoms of anxiety and tremors. He is interested in  "starting Naltrexone during this hospitalization for alcohol use disorder. Patient also is interested in purusing inpatient drug and alcohol rehab upon discharge.     His blood sugar has been in the 300's today. Consulted [facility] to see him. I did give him additional Insulin which has brought his blood sugar down to 81.    SEWS Total Score: 0 (8/25/2024 11:17 PM)         Objective:  Vitals: Blood pressure 147/90, pulse 78, temperature 97.5 °F (36.4 °C), temperature source Temporal, resp. rate 18, height 5' 8\" (1.727 m), weight 66.7 kg (147 lb), SpO2 98%.,Body mass index is 22.35 kg/m².    No intake or output data in the 24 hours ending 08/26/24 1541    Invasive Devices       Peripheral Intravenous Line  Duration             Peripheral IV 08/25/24 Left;Upper;Ventral (anterior) Arm 1 day                    Physical Exam  Vitals reviewed.   HENT:      Head: Normocephalic.      Nose: Nose normal.   Eyes:      Extraocular Movements: Extraocular movements intact.      Conjunctiva/sclera: Conjunctivae normal.   Cardiovascular:      Rate and Rhythm: Normal rate.   Pulmonary:      Effort: Pulmonary effort is normal.      Breath sounds: Normal breath sounds.   Abdominal:      General: Abdomen is flat.   Musculoskeletal:         General: Normal range of motion.      Cervical back: Normal range of motion.   Skin:     General: Skin is warm and dry.      Capillary Refill: Capillary refill takes less than 2 seconds.   Neurological:      General: No focal deficit present.      Mental Status: He is alert and oriented to person, place, and time.   Psychiatric:         Mood and Affect: Mood normal.         Behavior: Behavior normal.         Thought Content: Thought content normal.         Judgment: Judgment normal.      Lab, Imaging and other studies: I have personally reviewed pertinent reports.    Results Reviewed       Procedure Component Value Units Date/Time    Hemoglobin A1c w/EAG Estimation (Prechecked if no A1C within 90 " days) [[id number]]  (Abnormal) Collected: 08/24/24 1205    Lab Status: Final result Specimen: Blood from Arm, Left Updated: 08/25/24 0010     Hemoglobin A1C 8.4 %       mg/dl     Rapid drug screen, urine [[id number]]  (Abnormal) Collected: 08/24/24 1220    Lab Status: Final result Specimen: Urine, Clean Catch Updated: 08/24/24 1241     Amph/Meth UR Negative     Barbiturate Ur Negative     Benzodiazepine Urine Negative     Cocaine Urine Positive     Methadone Urine Negative     Opiate Urine Negative     PCP Ur Negative     THC Urine Positive     Oxycodone Urine Negative     Fentanyl Urine Negative     HYDROCODONE URINE Negative    Narrative:      Presumptive report. If requested, specimen will be sent to reference lab for confirmation.  FOR MEDICAL PURPOSES ONLY.   IF CONFIRMATION NEEDED PLEASE CONTACT THE LAB WITHIN 5 DAYS.    Drug Screen Cutoff Levels:  AMPHETAMINE/METHAMPHETAMINES  1000 ng/mL  BARBITURATES     200 ng/mL  BENZODIAZEPINES     200 ng/mL  COCAINE      300 ng/mL  METHADONE      300 ng/mL  OPIATES      300 ng/mL  PHENCYCLIDINE     25 ng/mL  THC       50 ng/mL  OXYCODONE      100 ng/mL  FENTANYL      5 ng/mL  HYDROCODONE     300 ng/mL    Ethanol [[id number]]  (Normal) Collected: 08/24/24 1205    Lab Status: Final result Specimen: Blood from Arm, Left Updated: 08/24/24 1240     Ethanol Lvl <10 mg/dL     Comprehensive metabolic panel [[id number]]  (Abnormal) Collected: 08/24/24 1205    Lab Status: Final result Specimen: Blood from Arm, Left Updated: 08/24/24 1230     Sodium 139 mmol/L      Potassium 3.7 mmol/L      Chloride 101 mmol/L      CO2 29 mmol/L      ANION GAP 9 mmol/L      BUN 18 mg/dL      Creatinine 1.00 mg/dL      Glucose 77 mg/dL      Calcium 10.1 mg/dL      AST 23 U/L      ALT 25 U/L      Alkaline Phosphatase 134 U/L      Total Protein 7.3 g/dL      Albumin 4.7 g/dL      Total Bilirubin 0.34 mg/dL      eGFR 86 ml/min/1.73sq m     Narrative:      [employer]  guidelines for Chronic Kidney Disease (CKD):     Stage 1 with normal or high GFR (GFR > 90 mL/min/1.73 square meters)    Stage 2 Mild CKD (GFR = 60-89 mL/min/1.73 square meters)    Stage 3A Moderate CKD (GFR = 45-59 mL/min/1.73 square meters)    Stage 3B Moderate CKD (GFR = 30-44 mL/min/1.73 square meters)    Stage 4 Severe CKD (GFR = 15-29 mL/min/1.73 square meters)    Stage 5 End Stage CKD (GFR <15 mL/min/1.73 square meters)  Note: GFR calculation is accurate only with a steady state creatinine    Magnesium [[id number]]  (Normal) Collected: 08/24/24 1205    Lab Status: Final result Specimen: Blood from Arm, Left Updated: 08/24/24 1230     Magnesium 1.9 mg/dL     CBC and differential [[id number]]  (Abnormal) Collected: 08/24/24 1205    Lab Status: Final result Specimen: Blood from Arm, Left Updated: 08/24/24 1216     WBC 7.84 Thousand/uL      RBC 4.01 Million/uL      Hemoglobin 13.1 g/dL      Hematocrit 40.2 %       fL      MCH 32.7 pg      MCHC 32.6 g/dL      RDW 11.6 %      MPV 8.9 fL      Platelets 331 Thousands/uL      nRBC 0 /100 WBCs      Segmented % 46 %      Immature Grans % 0 %      Lymphocytes % 38 %      Monocytes % 9 %      Eosinophils Relative 6 %      Basophils Relative 1 %      Absolute Neutrophils 3.59 Thousands/µL      Absolute Immature Grans 0.01 Thousand/uL      Absolute Lymphocytes 2.96 Thousands/µL      Absolute Monocytes 0.73 Thousand/µL      Eosinophils Absolute 0.50 Thousand/µL      Basophils Absolute 0.05 Thousands/µL     Fingerstick Glucose (POCT) [[id number]]  (Normal) Collected: 08/24/24 1214    Lab Status: Final result Specimen: Blood Updated: 08/24/24 1215     POC Glucose 126 mg/dl            VTE Pharmacologic Prophylaxis: Enoxaparin (Lovenox)  VTE Mechanical Prophylaxis: sequential compression device

## 2024-08-26 NOTE — ASSESSMENT & PLAN NOTE
Lab Results   Component Value Date    HGBA1C 8.4 (H) 08/24/2024       Recent Labs     08/26/24  0759 08/26/24  1136 08/26/24  1514 08/26/24  1619   POCGLU 472* 319* 81 105       Blood Sugar Average: Last 72 hrs:  (P) [id number]    Uncontrolled type I DM due to medication noncompliance   Diagnosis since the age of 17  Patient lost follow-up with his endocrinologist and stopped using insulin pump since January 2023   Patient was given his own insulin dose at home without proper prescriptions (every 1 unit to bring BS 10 mg down)  Patient was started on Lantus 5 units every morning, lispro 12 units 3 times daily AC by primary team  Patient current BS is 81, 105  Hold off lispro dose for now.  Continue sliding scale insulin  We will plan to recheck after he eats.  If BS gets high, will put lispro dose as needed.  According to his body weight, will do Lantus 15 unit every morning, lispro 8 units 3 times daily AC starting tomorrow morning

## 2024-08-26 NOTE — CONSULTS
[facility]  Consult  Name: Cesar Theodore 54 y.o. male I MRN: [id number]  Unit/Bed#: 5T DETOX 518-01 I Date of Admission: 8/24/2024   Date of Service: 8/26/2024 I Hospital Day: 2    Inpatient consult to Internal Medicine  Consult performed by: Sharmila Chavez MD  Consult ordered by: Carlos Eduardo Daniels PA-C          Assessment & Plan   DM I (diabetes mellitus, type I) ([facility])  Assessment & Plan  Lab Results   Component Value Date    HGBA1C 8.4 (H) 08/24/2024       Recent Labs     08/26/24  0759 08/26/24  1136 08/26/24  1514 08/26/24  1619   POCGLU 472* 319* 81 105       Blood Sugar Average: Last 72 hrs:  (P) 297.2314318181856360    Uncontrolled type I DM due to medication noncompliance   Diagnosis since the age of 17  Patient lost follow-up with his endocrinologist and stopped using insulin pump since January 2023   Patient was given his own insulin dose at home without proper prescriptions (every 1 unit to bring BS 10 mg down)  Patient was started on Lantus 5 units every morning, lispro 12 units 3 times daily AC by primary team  Patient current BS is 81, 105  Hold off lispro dose for now.  Continue sliding scale insulin  We will plan to recheck after he eats.  If BS gets high, will put lispro dose as needed.  According to his body weight, will do Lantus 15 unit every morning, lispro 8 units 3 times daily AC starting tomorrow morning        Tobacco use disorder  Assessment & Plan  NRT, smoking cessation education    HTN (hypertension)  Assessment & Plan  BP stable.  Patient was on lisinopril 5 mg daily at home questionable compliance  Lisinopril currently on hold by primary team    Hyperlipidemia  Assessment & Plan  Continue prehospital Lipitor 40 mg daily         Total Time for Visit, including Counseling / Coordination of Care: 45 minutes.  Greater than 50% of this total time spent on direct patient counseling and coordination of care.    Chief Complaint:   Type I diabetic  "management    History of Present Illness:    Cesar Theodore is a 54 y.o. male with PMH of type I DM, HLD, HTN, alcohol dependence who was admitted under medical toxicology service for alcohol withdrawal management.  We are consulted for diabetic management.  According to the patient, he was diagnosed with diabetic since the age of 17.  Patient was seen by endocrinologist in the past however he lost follow-up and stopped using his insulin pump since 2023.  Since then, patient has been given sliding scale insulin on his own without  proper prescriptions or instructions.  The company has been trying to give him new insulin pump however he stated \" I am just lazy.\"  Patient is currently encouraged to follow-up with his own endocrinologist after discharge.  Patient does not seem to be mindful about his diet as well.    Review of Systems:    Review of Systems Patient was seen and examined at bedside. The patient denies any pain, headache, blurry vision, chest pain, palpitation, shortness of breath, N/V, abd pain.      Past Medical and Surgical History:     Past Medical History:   Diagnosis Date    Diabetes mellitus (HCC)     GERD (gastroesophageal reflux disease)     Hyperlipidemia     Hypertension        Past Surgical History:   Procedure Laterality Date    HAND NERVE REPAIR Right     LEG SURGERY         Meds/Allergies:    Prior to Admission medications    Medication Sig Start Date End Date Taking? Authorizing Provider   amphetamine-dextroamphetamine (ADDERALL XR) 20 MG 24 hr capsule Take 20 mg by mouth every morning 2/22/23  Yes Historical Provider, MD Diony Ferguson 100 units/mL injection pen TO BE USED VIA INSULIN PUMP. UP  UNITS/DAY, NORMAL 12/17/22  Yes Historical Provider, MD   buPROPion (WELLBUTRIN XL) 300 mg 24 hr tablet Take 300 mg by mouth every morning 12/9/22  Yes Historical Provider, MD   hydrOXYzine HCL (ATARAX) 50 mg tablet Take 50 mg by mouth 2 (two) times a day as needed 12/9/22  Yes Historical " Provider, MD   insulin aspart (NovoLOG FlexPen) 100 UNIT/ML injection pen INJECT 30 UNITS UNDER THE SKIN 3 (THREE) TIMES A DAY BEFORE MEALS. PT IS ON SLIDING SCALE 8/18/20  Yes Historical Provider, MD   insulin glulisine (Apidra SoloStar) 100 units/mL injection pen To be used via insulin pump. Up to 100 units/day, Normal 11/1/22  Yes Historical Provider, MD   Lantus SoloStar 100 units/mL injection pen 30 Units daily 11/22/20  Yes Historical Provider, MD   lisinopril (ZESTRIL) 5 mg tablet Take 5 mg by mouth daily 12/5/22  Yes Historical Provider, MD   pantoprazole (PROTONIX) 40 mg tablet Take 40 mg by mouth daily 12/5/22  Yes Historical Provider, MD   atorvastatin (LIPITOR) 40 mg tablet Take 40 mg by mouth daily 3/11/20 3/11/21  Historical Provider, MD   cephalexin (KEFLEX) 500 mg capsule Take 500 mg by mouth every 6 (six) hours    Historical Provider, MD   DULoxetine (CYMBALTA) 30 mg delayed release capsule Take 30 mg by mouth daily 2/21/23 6/21/23  Historical Provider, MD   Elastic Bandages & Supports (Medical Compression Stockings) MISC Use daily Knee High 20-30mmHg  Patient not taking: Reported on 8/24/2024 12/7/20   Carla Enciso MD   Elastic Bandages & Supports (Medical Compression Thigh High) MISC Use daily 20-30mmHg  Patient not taking: Reported on 8/24/2024 12/7/20   Carla Enciso MD   fexofenadine (ALLEGRA) 60 MG tablet Take 60 mg by mouth daily  Patient not taking: Reported on 8/24/2024 12/5/22   Historical Provider, MD   folic acid (FOLVITE) 800 MCG tablet Take 400 mcg by mouth daily 5/26/20 5/26/21  Historical Provider, MD   gabapentin (NEURONTIN) 300 mg capsule Take 300 mg by mouth Three times a day 2/21/20 2/20/21  Historical Provider, MD   hydrOXYzine HCL (ATARAX) 25 mg tablet Take 1 tablet (25 mg total) by mouth every 6 (six) hours as needed for itching  Patient not taking: Reported on 8/24/2024 9/19/23   Otoniel Baer MD   ibuprofen (MOTRIN) 800 mg tablet TAKE 1 TABLET (800 MG  TOTAL) BY MOUTH DAILY AS NEEDED FOR MODERATE PAIN (PAIN SCORE 4-6). 12/5/22   Historical Provider, MD   Methylphenidate HCl ER 18 MG TB24 Take 18 mg by mouth daily  Patient not taking: Reported on 8/24/2024 11/11/22   Historical Provider, MD   pregabalin (LYRICA) 25 mg capsule Take 1 capsule by mouth 2 (two) times a day  Patient not taking: Reported on 8/24/2024 8/29/22   Historical Provider, MD   vitamin B-12 (VITAMIN B-12) 500 mcg tablet Take 500 mcg by mouth daily  Patient not taking: Reported on 8/24/2024 9/1/20   Historical Provider, MD   zolpidem (AMBIEN) 10 mg tablet Take 10 mg by mouth daily at bedtime as needed  Patient not taking: Reported on 8/24/2024 12/9/22   Historical Provider, MD   ciprofloxacin-dexamethasone (CIPRODEX) otic suspension Administer 4 drops to the right ear 2 (two) times a day 12/2/20 12/18/20  Fritz Rodriguez DO       Allergies:   Allergies   Allergen Reactions    Pollen Extract Sneezing       Social History:     Marital Status: Single     Substance Use History:   Social History     Substance and Sexual Activity   Alcohol Use Yes    Alcohol/week: 12.0 standard drinks of alcohol    Types: 12 Standard drinks or equivalent per week     Social History     Tobacco Use   Smoking Status Every Day    Current packs/day: 1.00    Types: Cigarettes   Smokeless Tobacco Never     Social History     Substance and Sexual Activity   Drug Use Yes    Frequency: 7.0 times per week    Types: Marijuana       Family History:    Family History   Problem Relation Age of Onset    No Known Problems Mother     No Known Problems Father     No Known Problems Sister     No Known Problems Brother     No Known Problems Maternal Grandmother     No Known Problems Maternal Grandfather     No Known Problems Paternal Grandmother     No Known Problems Paternal Grandfather     No Known Problems Maternal Aunt     No Known Problems Maternal Uncle     No Known Problems Paternal Aunt     No Known Problems Paternal Uncle      "No Known Problems Cousin        Physical Exam:     Vitals:   Blood Pressure: 147/90 (08/26/24 1130)  Pulse: 78 (08/26/24 1130)  Temperature: 97.5 °F (36.4 °C) (08/26/24 1130)  Temp Source: Temporal (08/26/24 1130)  Respirations: 18 (08/26/24 1130)  Height: 5' 8\" (172.7 cm) (08/24/24 1336)  Weight - Scale: 66.7 kg (147 lb) (08/24/24 1336)  SpO2: 98 % (08/26/24 1130)    Physical Exam    General: no acute distress  HEENT: NC/AT, PERRL, EOM - normal  Neck: Supple  Pulm/Chest: Normal chest wall expansion, clear breath sounds on both side  CVS: normal S1&S2, capillary refill <2s  Abd: soft, non tender, non distended, bowel sounds +  MSK: move all 4 extremities spontaneously  Skin: warm  CNS: no acute focal neuro deficit      Additional Data:     Lab Results: I have personally reviewed pertinent reports.      Results from last 7 days   Lab Units 08/25/24  0436 08/24/24  1205   WBC Thousand/uL 6.82 7.84   HEMOGLOBIN g/dL 12.5 13.1   HEMATOCRIT % 36.7 40.2   PLATELETS Thousands/uL 309 331   SEGS PCT %  --  46   LYMPHO PCT %  --  38   MONO PCT %  --  9   EOS PCT %  --  6     Results from last 7 days   Lab Units 08/25/24  0436   SODIUM mmol/L 131*   POTASSIUM mmol/L 4.3   CHLORIDE mmol/L 96   CO2 mmol/L 23   BUN mg/dL 17   CREATININE mg/dL 0.88   ANION GAP mmol/L 12   CALCIUM mg/dL 9.1   ALBUMIN g/dL 4.1   TOTAL BILIRUBIN mg/dL 0.60   ALK PHOS U/L 124*   ALT U/L 19   AST U/L 14   GLUCOSE RANDOM mg/dL 492*         Results from last 7 days   Lab Units 08/26/24  1619 08/26/24  1514 08/26/24  1136 08/26/24  0759 08/26/24  0727 08/25/24  1631 08/25/24  1100 08/25/24  0630 08/25/24  0431 08/24/24  2049 08/24/24  1553 08/24/24  1214   POC GLUCOSE mg/dl 105 81 319* 472* 456* 157* 189* 343* 529* 278* 517* 126     Results from last 7 days   Lab Units 08/24/24  1205   HEMOGLOBIN A1C % 8.4*           Imaging: I have personally reviewed pertinent reports.      No orders to display         Allscripts / Epic Records Reviewed: Yes     ** " Please Note: This note has been constructed using a voice recognition system. **

## 2024-08-26 NOTE — ASSESSMENT & PLAN NOTE
BP stable.  Patient was on lisinopril 5 mg daily at home questionable compliance  Lisinopril currently on hold by primary team

## 2024-08-27 VITALS
OXYGEN SATURATION: 100 % | RESPIRATION RATE: 18 BRPM | DIASTOLIC BLOOD PRESSURE: 68 MMHG | SYSTOLIC BLOOD PRESSURE: 144 MMHG | HEART RATE: 68 BPM | BODY MASS INDEX: 22.28 KG/M2 | HEIGHT: 68 IN | TEMPERATURE: 96.3 F | WEIGHT: 147 LBS

## 2024-08-27 PROBLEM — R79.89 PSEUDOHYPONATREMIA: Status: ACTIVE | Noted: 2024-08-27

## 2024-08-27 PROBLEM — E83.42 HYPOMAGNESEMIA: Status: ACTIVE | Noted: 2024-08-27

## 2024-08-27 LAB
ALBUMIN SERPL BCG-MCNC: 3.9 G/DL (ref 3.5–5)
ALP SERPL-CCNC: 109 U/L (ref 34–104)
ALT SERPL W P-5'-P-CCNC: 18 U/L (ref 7–52)
ANION GAP SERPL CALCULATED.3IONS-SCNC: 6 MMOL/L (ref 4–13)
AST SERPL W P-5'-P-CCNC: 23 U/L (ref 13–39)
BILIRUB SERPL-MCNC: 0.33 MG/DL (ref 0.2–1)
BUN SERPL-MCNC: 20 MG/DL (ref 5–25)
CALCIUM SERPL-MCNC: 8.8 MG/DL (ref 8.4–10.2)
CHLORIDE SERPL-SCNC: 98 MMOL/L (ref 96–108)
CO2 SERPL-SCNC: 31 MMOL/L (ref 21–32)
CREAT SERPL-MCNC: 0.95 MG/DL (ref 0.6–1.3)
GFR SERPL CREATININE-BSD FRML MDRD: 91 ML/MIN/1.73SQ M
GLUCOSE SERPL-MCNC: 142 MG/DL (ref 65–140)
GLUCOSE SERPL-MCNC: 148 MG/DL (ref 65–140)
GLUCOSE SERPL-MCNC: 192 MG/DL (ref 65–140)
GLUCOSE SERPL-MCNC: 194 MG/DL (ref 65–140)
GLUCOSE SERPL-MCNC: 238 MG/DL (ref 65–140)
GLUCOSE SERPL-MCNC: 387 MG/DL (ref 65–140)
MAGNESIUM SERPL-MCNC: 1.8 MG/DL (ref 1.9–2.7)
POTASSIUM SERPL-SCNC: 4.3 MMOL/L (ref 3.5–5.3)
PROT SERPL-MCNC: 6.3 G/DL (ref 6.4–8.4)
SODIUM SERPL-SCNC: 135 MMOL/L (ref 135–147)

## 2024-08-27 PROCEDURE — 83735 ASSAY OF MAGNESIUM: CPT

## 2024-08-27 PROCEDURE — 82948 REAGENT STRIP/BLOOD GLUCOSE: CPT

## 2024-08-27 PROCEDURE — 99239 HOSP IP/OBS DSCHRG MGMT >30: CPT | Performed by: PHYSICIAN ASSISTANT

## 2024-08-27 PROCEDURE — 80053 COMPREHEN METABOLIC PANEL: CPT | Performed by: EMERGENCY MEDICINE

## 2024-08-27 PROCEDURE — 99232 SBSQ HOSP IP/OBS MODERATE 35: CPT | Performed by: INTERNAL MEDICINE

## 2024-08-27 RX ORDER — BUPROPION HYDROCHLORIDE 300 MG/1
300 TABLET ORAL EVERY MORNING
Qty: 30 TABLET | Refills: 0 | Status: SHIPPED | OUTPATIENT
Start: 2024-08-27

## 2024-08-27 RX ORDER — MAGNESIUM SULFATE 1 G/100ML
1 INJECTION INTRAVENOUS ONCE
Status: DISCONTINUED | OUTPATIENT
Start: 2024-08-27 | End: 2024-08-27 | Stop reason: HOSPADM

## 2024-08-27 RX ORDER — INSULIN LISPRO 100 [IU]/ML
10 INJECTION, SOLUTION INTRAVENOUS; SUBCUTANEOUS
Status: DISCONTINUED | OUTPATIENT
Start: 2024-08-27 | End: 2024-08-27 | Stop reason: HOSPADM

## 2024-08-27 RX ORDER — GABAPENTIN 100 MG/1
100 CAPSULE ORAL 3 TIMES DAILY
Status: DISCONTINUED | OUTPATIENT
Start: 2024-08-27 | End: 2024-08-27 | Stop reason: HOSPADM

## 2024-08-27 RX ORDER — DEXTROAMPHETAMINE SACCHARATE, AMPHETAMINE ASPARTATE MONOHYDRATE, DEXTROAMPHETAMINE SULFATE AND AMPHETAMINE SULFATE 5; 5; 5; 5 MG/1; MG/1; MG/1; MG/1
20 CAPSULE, EXTENDED RELEASE ORAL EVERY MORNING
Qty: 10 CAPSULE | Refills: 0 | Status: SHIPPED | OUTPATIENT
Start: 2024-08-27 | End: 2024-09-06

## 2024-08-27 RX ORDER — LANOLIN ALCOHOL/MO/W.PET/CERES
100 CREAM (GRAM) TOPICAL DAILY
Qty: 30 TABLET | Refills: 0 | Status: SHIPPED | OUTPATIENT
Start: 2024-08-28

## 2024-08-27 RX ORDER — NICOTINE 21 MG/24HR
1 PATCH, TRANSDERMAL 24 HOURS TRANSDERMAL DAILY
Qty: 28 PATCH | Refills: 0 | Status: SHIPPED | OUTPATIENT
Start: 2024-08-27

## 2024-08-27 RX ORDER — LANOLIN ALCOHOL/MO/W.PET/CERES
800 CREAM (GRAM) TOPICAL ONCE
Status: COMPLETED | OUTPATIENT
Start: 2024-08-27 | End: 2024-08-27

## 2024-08-27 RX ORDER — FOLIC ACID 1 MG/1
1 TABLET ORAL DAILY
Qty: 30 TABLET | Refills: 0 | Status: SHIPPED | OUTPATIENT
Start: 2024-08-28

## 2024-08-27 RX ORDER — GABAPENTIN 100 MG/1
100 CAPSULE ORAL 3 TIMES DAILY
Qty: 90 CAPSULE | Refills: 0 | Status: SHIPPED | OUTPATIENT
Start: 2024-08-27 | End: 2024-08-27

## 2024-08-27 RX ORDER — NICOTINE 21 MG/24HR
21 PATCH, TRANSDERMAL 24 HOURS TRANSDERMAL DAILY
Status: DISCONTINUED | OUTPATIENT
Start: 2024-08-27 | End: 2024-08-27 | Stop reason: HOSPADM

## 2024-08-27 RX ORDER — NICOTINE 21 MG/24HR
1 PATCH, TRANSDERMAL 24 HOURS TRANSDERMAL DAILY
Qty: 28 PATCH | Refills: 0 | Status: SHIPPED | OUTPATIENT
Start: 2024-08-28

## 2024-08-27 RX ORDER — INSULIN GLARGINE 100 [IU]/ML
15 INJECTION, SOLUTION SUBCUTANEOUS
Qty: 10 ML | Refills: 0 | Status: SHIPPED | OUTPATIENT
Start: 2024-08-27

## 2024-08-27 RX ORDER — INSULIN LISPRO 100 [IU]/ML
10 INJECTION, SOLUTION INTRAVENOUS; SUBCUTANEOUS
Qty: 9 ML | Refills: 0 | Status: SHIPPED | OUTPATIENT
Start: 2024-08-27

## 2024-08-27 RX ORDER — ATORVASTATIN CALCIUM 40 MG/1
40 TABLET, FILM COATED ORAL DAILY
Qty: 30 TABLET | Refills: 0 | Status: SHIPPED | OUTPATIENT
Start: 2024-08-27 | End: 2025-08-27

## 2024-08-27 RX ORDER — FOLIC ACID 1 MG/1
1 TABLET ORAL DAILY
Qty: 30 TABLET | Refills: 0 | Status: SHIPPED | OUTPATIENT
Start: 2024-08-28 | End: 2024-08-27

## 2024-08-27 RX ADMIN — CHLORDIAZEPOXIDE HYDROCHLORIDE 25 MG: 25 CAPSULE ORAL at 05:01

## 2024-08-27 RX ADMIN — HYDROXYZINE HYDROCHLORIDE 50 MG: 50 TABLET, FILM COATED ORAL at 14:20

## 2024-08-27 RX ADMIN — INSULIN LISPRO 8 UNITS: 100 INJECTION, SOLUTION INTRAVENOUS; SUBCUTANEOUS at 07:47

## 2024-08-27 RX ADMIN — NICOTINE 21 MG: 21 PATCH, EXTENDED RELEASE TRANSDERMAL at 08:36

## 2024-08-27 RX ADMIN — NICOTINE 21 MG: 21 PATCH, EXTENDED RELEASE TRANSDERMAL at 14:49

## 2024-08-27 RX ADMIN — FOLIC ACID 1 MG: 1 TABLET ORAL at 08:35

## 2024-08-27 RX ADMIN — GABAPENTIN 100 MG: 100 CAPSULE ORAL at 12:01

## 2024-08-27 RX ADMIN — INSULIN LISPRO 10 UNITS: 100 INJECTION, SOLUTION INTRAVENOUS; SUBCUTANEOUS at 11:37

## 2024-08-27 RX ADMIN — Medication 800 MG: at 07:46

## 2024-08-27 RX ADMIN — NICOTINE POLACRILEX 2 MG: 2 GUM, CHEWING BUCCAL at 14:20

## 2024-08-27 RX ADMIN — DIPHENHYDRAMINE HYDROCHLORIDE AND LIDOCAINE HYDROCHLORIDE AND ALUMINUM HYDROXIDE AND MAGNESIUM HYDRO 10 ML: KIT at 06:11

## 2024-08-27 RX ADMIN — GABAPENTIN 100 MG: 100 CAPSULE ORAL at 16:07

## 2024-08-27 RX ADMIN — PANTOPRAZOLE SODIUM 40 MG: 40 TABLET, DELAYED RELEASE ORAL at 08:35

## 2024-08-27 RX ADMIN — ATORVASTATIN CALCIUM 40 MG: 40 TABLET, FILM COATED ORAL at 08:35

## 2024-08-27 RX ADMIN — ACETAMINOPHEN 650 MG: 325 TABLET ORAL at 14:19

## 2024-08-27 RX ADMIN — Medication 1 APPLICATION: at 11:06

## 2024-08-27 RX ADMIN — INSULIN LISPRO 10 UNITS: 100 INJECTION, SOLUTION INTRAVENOUS; SUBCUTANEOUS at 16:07

## 2024-08-27 RX ADMIN — KETOROLAC TROMETHAMINE 30 MG: 30 INJECTION, SOLUTION INTRAMUSCULAR; INTRAVENOUS at 05:05

## 2024-08-27 RX ADMIN — ACETAMINOPHEN 650 MG: 325 TABLET ORAL at 08:43

## 2024-08-27 RX ADMIN — INSULIN LISPRO 4 UNITS: 100 INJECTION, SOLUTION INTRAVENOUS; SUBCUTANEOUS at 07:47

## 2024-08-27 RX ADMIN — THIAMINE HCL TAB 100 MG 100 MG: 100 TAB at 08:35

## 2024-08-27 RX ADMIN — VORTIOXETINE 20 MG: 20 TABLET, FILM COATED ORAL at 08:35

## 2024-08-27 RX ADMIN — INSULIN LISPRO 10 UNITS: 100 INJECTION, SOLUTION INTRAVENOUS; SUBCUTANEOUS at 16:06

## 2024-08-27 NOTE — PROGRESS NOTES
[facility]  Progress Note  Name: Cesar Theodore I  MRN: [id number]  Unit/Bed#: 5T DETOX 518-01 I Date of Admission: 8/24/2024   Date of Service: 8/27/2024 I Hospital Day: 3    Assessment & Plan   DM I (diabetes mellitus, type I) ([facility])  Assessment & Plan  Lab Results   Component Value Date    HGBA1C 8.4 (H) 08/24/2024       Recent Labs     08/26/24  1733 08/26/24  2123 08/27/24  0429 08/27/24  0735   POCGLU 176* 417* 192* 238*         Blood Sugar Average: Last 72 hrs:  (P) 287.1875    Uncontrolled type I DM due to medication noncompliance   Diagnosis since the age of 17  Patient lost follow-up with his endocrinologist and stopped using insulin pump since January 2023   Patient was given his own insulin dose at home without proper prescriptions (every 1 unit to bring BS 10 mg down)  Patient was started on Lantus 5 units every morning, lispro 12 units 3 times daily AC by primary team  Patient current BS is 81, 105 on 8/26/24, therefore dinner dose of lispro was on hold  According to his body weight, initiated on Lantus 15 unit every night, lispro 10 units 3 times daily AC  Patient is recommended to follow-up with endocrinologist (relayed the information to primary team for referral).      Tobacco use disorder  Assessment & Plan  NRT, smoking cessation education    Anxiety  Assessment & Plan  Patient asked for outpatient psychiatry referral  Relayed the information to the primary team    HTN (hypertension)  Assessment & Plan  BP stable.  Patient was on lisinopril 5 mg daily at home -questionable compliance  Lisinopril currently on hold by primary team    Hyperlipidemia  Assessment & Plan  Continue prehospital Lipitor 40 mg daily         Time Spent for Care:  35 minutes .  This time was spent on one or more of the following: performing physical exam; counseling and coordination of care; obtaining or reviewing history; documenting in the medical record; reviewing/ordering tests,  medications or procedures; communicating with other healthcare professionals and discussing with patient's family/caregivers.    Current Length of Stay: 3 day(s)    Current Patient Status: Inpatient     Code Status: Level 1 - Full Code      Subjective:   Patient was seen and examined at bedside.  Per patient, he was taking long-acting insulin multiple times at home.  Patient was educated that he is not supposed to do so and recommended to follow-up with endocrinologist for proper instructions    Objective:     Vitals:   Temp (24hrs), Av.9 °F (36.1 °C), Min:96.3 °F (35.7 °C), Max:97.5 °F (36.4 °C)    Temp:  [96.3 °F (35.7 °C)-97.5 °F (36.4 °C)] 96.3 °F (35.7 °C)  HR:  [65-81] 68  Resp:  [18] 18  BP: (140-157)/(68-90) 144/68  SpO2:  [96 %-100 %] 100 %  Body mass index is 22.35 kg/m².     Input and Output Summary (last 24 hours):     No intake or output data in the 24 hours ending 24 1033    Physical Exam:     Physical Exam  General: no acute distress  HEENT: NC/AT, PERRL, EOM - normal  Neck: Supple  Pulm/Chest: Normal chest wall expansion, clear breath sounds on both side  CVS: normal S1&S2, capillary refill <2s  Abd: soft, non tender, non distended, bowel sounds +  MSK: move all 4 extremities spontaneously  Skin: warm  CNS: no acute focal neuro deficit      Additional Data:     Labs:    Results from last 7 days   Lab Units 24  0436 24  1205   WBC Thousand/uL 6.82 7.84   HEMOGLOBIN g/dL 12.5 13.1   HEMATOCRIT % 36.7 40.2   PLATELETS Thousands/uL 309 331   SEGS PCT %  --  46   LYMPHO PCT %  --  38   MONO PCT %  --  9   EOS PCT %  --  6     Results from last 7 days   Lab Units 24  0426   POTASSIUM mmol/L 4.3   CHLORIDE mmol/L 98   CO2 mmol/L 31   BUN mg/dL 20   CREATININE mg/dL 0.95   CALCIUM mg/dL 8.8   ALK PHOS U/L 109*   ALT U/L 18   AST U/L 23           * I Have Reviewed All Lab Data Listed Above.  * Additional Pertinent Lab Tests Reviewed: All Labs For Current Hospital Admission  Reviewed    Imaging:      I have reviewed pertinent imaging.      Recent Cultures (last 7 days):           Last 24 Hours Medication List:   Current Facility-Administered Medications   Medication Dose Route Frequency Provider Last Rate    acetaminophen  650 mg Oral Q6H PRN Lindsay Mccarty PA-C      atorvastatin  40 mg Oral Daily Lindsay Mccarty PA-C      benzocaine  1 Application Mucosal 4x Daily PRN Carlos Eduardo Daniels PA-C      chlordiazePOXIDE  25 mg Oral Q8H MATTHIEU Carlos Eduardo PHUONG Daniels PA-C      diphenhydramine, lidocaine, Al/Mg hydroxide, simethicone  10 mL Swish & Spit Q4H PRN Starla Iniguez PA-C      enoxaparin  40 mg Subcutaneous Daily Lindsay Mccarty PA-C      folic acid  1 mg Oral Daily Carlos Eduardo PHUONG Daniels PA-C      hydrOXYzine HCL  50 mg Oral BID PRN Lindsay Mccarty PA-C      insulin glargine  15 Units Subcutaneous HS Sharmila Chavez MD      insulin lispro  10 Units Subcutaneous TID With Meals Sharmila Chavez MD      insulin lispro  2-12 Units Subcutaneous 4x Daily (AC & HS) ELSA Seo      magnesium sulfate  1 g Intravenous Once ELSA Seo Stopped (08/27/24 0630)    nicotine  21 mg Transdermal Daily Carlos Eduardo PHUONG Daniels PA-C      nicotine polacrilex  2 mg Oral Q2H PRN Starla Iniguez PA-C      ondansetron  4 mg Intravenous Q6H PRN Lindsay Mccarty PA-C      pantoprazole  40 mg Oral Daily Lindsay Mccarty PA-C      senna-docusate sodium  1 tablet Oral HS Lindsay Mccarty PA-C      thiamine  100 mg Oral Daily Carlos Eduardo PHUONG Daniels PA-C      traZODone  50 mg Oral HS PRN Lindsay Mccarty PA-C      Vortioxetine HBr  20 mg Oral Daily Lindsay Mccarty PA-C      zolpidem  10 mg Oral HS PRN Starla Iniguez PA-C          Today, Patient Was Seen By: Sharmila Chavez MD    ** Please Note: Dragon 360 Dictation voice to text software may have been used in the creation of this document. **

## 2024-08-27 NOTE — DISCHARGE INSTR - OTHER ORDERS
DRUG AND ALCOHOL RESOURCES IN [facility]    If you have health insurance, including medical assistance, there should be a phone number on your insurance card that you can call to find out how to access services. The card may say, “For Behavioral Health Services” or “For Drug and Alcohol Services” or “For Substance Abuse Services” call the number provided. Or call [place] Get Help Now at [phone]    [facility] Drug and Alcohol  For information on how to access Drug and Alcohol treatment services please contact:  After hours 24/7 number: [facility] at [phone]  During regular business hours call:   [facility] Toll Free: [phone]   [facility]: [phone]  [facility]: [phone]  [facility] [phone]    [facility] National Helpline  Confidential free help, from public health agencies, to find substance use treatment and information.  1-390.339.6103    12 step Meetings    HOTLINE [phone]  [place] [facility] hotline: [phone]  AA meeting list can be found at [email or web address]    Karolyn Monte  Certified     [facility]  [phone]

## 2024-08-27 NOTE — PLAN OF CARE
Problem: SUBSTANCE USE/ABUSE  Goal: By discharge, will develop insight into their chemical dependency and sustain motivation to continue in recovery  Description: INTERVENTIONS:  - Attends all daily group sessions and scheduled AA groups  - Actively practices coping skills through participation in the therapeutic community and adherence to program rules  - Reviews and completes assignments from individual treatment plan  - Assist patient development of understanding of their personal cycle of addiction and relapse triggers  8/26/2024 2255 by Puma Corey RN  Outcome: Progressing  8/26/2024 2255 by Puma Corey RN  Outcome: Progressing  Goal: By discharge, patient will have ongoing treatment plan addressing chemical dependency  Description: INTERVENTIONS:  - Assist patient with resources and/or appointments for ongoing recovery based living  8/26/2024 2255 by Puma Corey RN  Outcome: Progressing  8/26/2024 2255 by Puma Corey RN  Outcome: Progressing     Problem: PAIN - ADULT  Goal: Verbalizes/displays adequate comfort level or baseline comfort level  Description: Interventions:  - Encourage patient to monitor pain and request assistance  - Assess pain using appropriate pain scale  - Administer analgesics based on type and severity of pain and evaluate response  - Implement non-pharmacological measures as appropriate and evaluate response  - Consider cultural and social influences on pain and pain management  - Notify physician/advanced practitioner if interventions unsuccessful or patient reports new pain  Outcome: Progressing     Problem: INFECTION - ADULT  Goal: Absence or prevention of progression during hospitalization  Description: INTERVENTIONS:  - Assess and monitor for signs and symptoms of infection  - Monitor lab/diagnostic results  - Monitor all insertion sites, i.e. indwelling lines, tubes, and drains  - Monitor endotracheal if appropriate and nasal secretions for  changes in amount and color  - [place] appropriate cooling/warming therapies per order  - Administer medications as ordered  - Instruct and encourage patient and family to use good hand hygiene technique  - Identify and instruct in appropriate isolation precautions for identified infection/condition  Outcome: Progressing  Goal: Absence of fever/infection during neutropenic period  Description: INTERVENTIONS:  - Monitor WBC    Outcome: Progressing     Problem: Knowledge Deficit  Goal: Patient/family/caregiver demonstrates understanding of disease process, treatment plan, medications, and discharge instructions  Description: Complete learning assessment and assess knowledge base.  Interventions:  - Provide teaching at level of understanding  - Provide teaching via preferred learning methods  Outcome: Progressing

## 2024-08-27 NOTE — ASSESSMENT & PLAN NOTE
H/o chronic daily alcohol consumption, denies h/o withdrawal seizures  Last drink: 8/23/24  Ethanol lvl: < 10   Continue SEWS protocol with symptom-triggered phenobarbital for medically-assisted alcohol withdrawal  Current symptoms include anxiety, tremors  Received a total of 1,040mg (last dose 8/25 15:46) of phenobarbital without complication   continue telemetry and pulse oximetry   Continue to monitor vitals, symptoms  Started on Librium 8/26 1800, continue for now.

## 2024-08-27 NOTE — PROGRESS NOTES
Referral Data   Referral Source Patient   Referral Name [facility] ED   Referral Reason Drug/Alcohol Abuse   County Information   County of Residence [place]   Patient Information   Mental Status Alert   Primary Caregiver Self   Support System Immediate family;Friends   Taoist/Cultural Requests no Jew affiliation   Legal Information   Legal Issues denies   Activities of Daily Living Prior to Admission   Functional Status Independent   Assistive Device No device needed   Living Arrangement House;Lives alone   Ambulation Independent   Access to Firearms   Access to Firearms No   Income Information   Income Source Self-employed  (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 Pt's name, date of birth, home address and telephone number were verified. Pt was informed of case management role and the purpose of the completion of intake with case management.        SUBSTANCE ABUSE    Stressor/Trigger    \"I've used for years\"   UDS: +cocaine, THC   Audit: 41  PAWSS: 5  Nicotine: 2 ppd  Ethanol: <10   Prior D&A treatment   Denies   AA/NA Meetings   Denies   Withdrawal  Symptoms   HTN, anxiety, and tremors    History of OD/blackouts or Withdrawal Seizures   Denies   MENTAL HEALTH INFORMATION    Hx of Mental Health Dx   Alcohol Use D/O, ADHD, Anxiety, Cocaine Use D/O, Cannabinoid Use D/O, Tobacco Use D/O    Outpatient Mental Health Tx   Denies   Inpatient Hospitalizations (Mental Health)   Denies   Family History of Mental Health    Denies   Trauma History    Denies   Current MH Symptoms   Denies   Access to Firearms    Denies   PHYSICAL HEALTH INFORMATION    Physical Health Conditions (Chronic)   DM, HTN, GERD, depression, ADHD, and alcohol use disorder    PCP   Jacques Rodriguez MD  Ph. [phone]   Insurance   [facility]      LEGAL ISSUES    Past or present legal issues   Denies   Probation/[place]   Denies   EMPLOYMENT/INCOME/NEEDS    Education   HSD/ Trade program in , , and carpentry   Employment Unemployed      History   Denies   Spiritual/Evangelical Beliefs   No affiliation   Transportation/Transport Home   Licensed/ has car   DEMOGRAPHICS    Discharge Address   [address]  [place] [address]   Living Arrangement   alone   Can pt return home Yes     External Supports     Sister, son, friends   Phone Number   [phone]   Email   [email or web address]   Marital Status/Children   Single/ 1 son (14)       Pt and CM completed and siggned relapse prevention plan.  Pt acknowledges that he has been a poly substance user for " years and has never sought treatment.  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  Pt's barriers are limited sober community support.  Pt's goals for detox are to successfully complete medical withdrawal and to develop a discharge plan that includes relapse prevention education.   Pt is in the  contemplation stage of change.

## 2024-08-27 NOTE — DISCHARGE SUMMARY
[facility]  Discharge- Cesar Theodore 1970, 54 y.o. male MRN: [id number]  Unit/Bed#: 5T DETOX 518-01 Encounter: [id number]  Primary Care Provider: Jacques Rodriguez MD   Date and time admitted to hospital: 8/24/2024 11:45 AM    * Alcohol withdrawal syndrome with complication (HCC)  Assessment & Plan  H/o chronic daily alcohol consumption, denies h/o withdrawal seizures  Last drink: 8/23/24  Ethanol lvl: < 10   Continue SEWS protocol with symptom-triggered phenobarbital for medically-assisted alcohol withdrawal  Current symptoms include anxiety, tremors  Received a total of 1,040mg (last dose 8/25 15:46) of phenobarbital without complication   continue telemetry and pulse oximetry   Continue to monitor vitals, symptoms    Alcohol use disorder, severe, dependence (HCC)  Assessment & Plan  Pt with a h/o chronic heavy alcohol use   Drinks 12 pack of beer  daily   Denies H/o withdrawal seizures  Interested naltrexone at this time  Withdrawal management as above  Initiate IVFs, IV thiamine, folic acid, and MVI  Consult case management/CRS for assistance with aftercare resources - pt interested in inpatient drug and alcohol at this time     Hypomagnesemia  Assessment & Plan  Mg 1.8  1G mag sulf given    Pseudohyponatremia  Assessment & Plan  Sodium 131, but corrected for  is 137.  Trend.    Tobacco use disorder  Assessment & Plan  Continue Nicotine patch    ADHD  Assessment & Plan  Currently takes Adderall XL 20 mg   PDMP reviewed - Last filled 7/31/24  Will hold during acute withdrawal setting and then resume     Cocaine use disorder (HCC)  Assessment & Plan  Intermittent cocaine use   Last use three days ago   Denies any current chest pain   Encourage Cessation     Anxiety  Assessment & Plan  Continue Atarax 50 mg q6 hours prn   Currently on Wellbutrin - will also hold during hospitalization, patient can resume upon discharge     HTN (hypertension)  Assessment & Plan  Patient with  a history of HTN  Home medication regimen: Lisinopril 5 mg   BP hypertensive upon arrival to the unit in the setting of acute alcohol withdrawal and chronic HTN  Most Recent 147/90  Will hold during acute withdrawal   Plan to resume anti-hypertensive therapy if BP remains high after resolution of acute withdrawal  Continue monitoring BP     Hyperlipidemia  Assessment & Plan  Continue atorvastatin 40 mg PO Qday    DM I (diabetes mellitus, type I) ([facility])  Assessment & Plan  Lab Results   Component Value Date    HGBA1C 8.4 (H) 08/24/2024       Recent Labs     08/26/24  1514 08/26/24  1619 08/26/24  1733 08/26/24  2123   POCGLU 81 105 176* 417*         Blood Sugar Average: Last 72 hrs:  (P) 297.5    Hgb A1c 8.4  Patient reports non-compliance with current medication. He states that he only takes insulin 10 units with meals.  Chart review  preformed- patient is prescribed Lantus 30 U daily, and insulin aspart 30 units with meals   Poorly controlled blood sugars- patient not adhering to diet  Initiate Lantus 15 U daily   Increased Insulin Lispro 8 units with meals  Algorithm 4 SSI QID  Continue to adjust insulin as needed for better control   Hypoglycemic protocol   Diabetic Diet       Gastroesophageal reflux disease without esophagitis  Assessment & Plan  Continue Protonix 40 mg PO Qday        Medical Problems       Resolved Problems  Date Reviewed: 8/27/2024   None         Admission Date:   Admission Orders (From admission, onward)       Ordered        08/24/24 1254  INPATIENT ADMISSION  Once                            Admitting Diagnosis: ETOH Withdrawal    HPI: 54yoM hx DM, HTN, GERD, depression, ADHD, and alcohol/cocaine use disorder presenting for ETOH detox.     Procedures Performed: No orders of the defined types were placed in this encounter.      Summary of Hospital Course: Initially admitted on 8/24, he was started on SEWS and given a total of 1,040mg of Phenobarb.  Last dose 8/25 15:46, now off SEWS.  His blood  sugar issues and other conditions were managed by internal medicine, and in the afternoon of 8/27 he is stable for DC.    Significant Findings, Care, Treatment and Services Provided: Treated for alcohol detox with IV Phenobarb.     Complications: none    Condition at Discharge: good         Discharge instructions/Information to patient and family:   See after visit summary for information provided to patient and family.      Patient was approved for D/C to a [facility] for inpatient but has refused and is now going home.    Provisions for Follow-Up Care:  See after visit summary for information related to follow-up care and any pertinent home health orders.      PCP: Jacques Rodriguez MD    Disposition: Home    Planned Readmission: No    Discharge Statement   I spent 45 minutes discharging the patient. This time was spent on the day of discharge. I had direct contact with the patient on the day of discharge. Additional documentation is required if more than 30 minutes were spent on discharge.     Discharge Medications:  See after visit summary for reconciled discharge medications provided to patient and family.

## 2024-08-27 NOTE — CERTIFIED RECOVERY SPECIALIST
Certified  Note    Patient name: Cesar Theodore  Location: 5T DETOX 518/5T DETOX 51*  [place]: [facility]  Attending:  Francis Cruz* MRN [id number]  : 1970  Age: 54 y.o.    Sex: male Date 2024         Substance Use History:     Social History     Substance and Sexual Activity   Alcohol Use Yes    Alcohol/week: 12.0 standard drinks of alcohol    Types: 12 Standard drinks or equivalent per week        Social History     Substance and Sexual Activity   Drug Use Yes    Frequency: 7.0 times per week    Types: Marijuana       CRS met with patient who fully engaged about his history. Patient is committed to going in to treatment for 28 days. He is deciding between a couple different facilities, with help from his family. CRS collaborated with CM about insurance and recovery support for patient. Provider aware, patient decided to discharge to home and will follow up with treatment centers who take his insurance or his family may have gotten him a scholarship somewhere.    CRS provided business card and resources.             Jose De Jesus Monte

## 2024-08-27 NOTE — ASSESSMENT & PLAN NOTE
Lab Results   Component Value Date    HGBA1C 8.4 (H) 08/24/2024       Recent Labs     08/26/24  1514 08/26/24  1619 08/26/24  1733 08/26/24 2123   POCGLU 81 105 176* 417*         Blood Sugar Average: Last 72 hrs:  (P) 297.5    Hgb A1c 8.4  Patient reports non-compliance with current medication. He states that he only takes insulin 10 units with meals.  Chart review  preformed- patient is prescribed Lantus 30 U daily, and insulin aspart 30 units with meals   Poorly controlled blood sugars- patient not adhering to diet  Initiate Lantus 15 U daily   Increased Insulin Lispro 8 units with meals  Algorithm 4 SSI QID  Continue to adjust insulin as needed for better control   Hypoglycemic protocol   Diabetic Diet

## 2024-08-27 NOTE — ASSESSMENT & PLAN NOTE
Lab Results   Component Value Date    HGBA1C 8.4 (H) 08/24/2024       Recent Labs     08/26/24  1733 08/26/24  2123 08/27/24  0429 08/27/24  0735   POCGLU 176* 417* 192* 238*         Blood Sugar Average: Last 72 hrs:  (P) 287.1875    Uncontrolled type I DM due to medication noncompliance   Diagnosis since the age of 17  Patient lost follow-up with his endocrinologist and stopped using insulin pump since January 2023   Patient was given his own insulin dose at home without proper prescriptions (every 1 unit to bring BS 10 mg down)  Patient was started on Lantus 5 units every morning, lispro 12 units 3 times daily AC by primary team  Patient current BS is 81, 105 on 8/26/24, therefore dinner dose of lispro was on hold  According to his body weight, initiated on Lantus 15 unit every night, lispro 10 units 3 times daily AC  Patient is recommended to follow-up with endocrinologist (relayed the information to primary team for referral).

## 2024-08-27 NOTE — ASSESSMENT & PLAN NOTE
BP stable.  Patient was on lisinopril 5 mg daily at home -questionable compliance  Lisinopril currently on hold by primary team

## 2024-08-28 NOTE — UTILIZATION REVIEW
NOTIFICATION OF ADMISSION DISCHARGE   This is a Notification of Discharge from [facility]. Please be advised that this patient has been discharge from our facility. Below you will find the admission and discharge date and time including the patient’s disposition.   UTILIZATION REVIEW CONTACT:  Denice Amezquita MA  Utilization   Network Utilization Review Department  Phone: [phone] x carefully listen to the prompts. All voicemails are confidential.  Email: [email or web address]     ADMISSION INFORMATION  PRESENTATION DATE: 8/24/2024 11:45 AM  OBERVATION ADMISSION DATE: N/A  INPATIENT ADMISSION DATE: 8/24/24 12:54 PM   DISCHARGE DATE: 8/27/2024  5:40 PM   DISPOSITION:Home/Self Care    Network Utilization Review Department  ATTENTION: Please call with any questions or concerns to [phone] and carefully listen to the prompts so that you are directed to the right person. All voicemails are confidential.   For Discharge needs, contact Care Management DC Support Team at [phone] opt. 2  Send all requests for admission clinical reviews, approved or denied determinations and any other requests to dedicated fax number below belonging to the campus where the patient is receiving treatment. List of dedicated fax numbers for the Facilities:  FACILITY NAME UR FAX NUMBER   ADMISSION DENIALS (Administrative/Medical Necessity) [phone]   DISCHARGE SUPPORT TEAM ([facility]) [phone]   PARENT CHILD HEALTH (Maternity/NICU/Pediatrics) [phone]   [facility] [phone]   [facility] [phone]   [facility] [phone]   [facility] [phone]   [facility] [phone]   [facility] [phone]   [facility] [phone]   [facility]  [phone]   [facility] [phone]   [facility] [phone]   [facility] [phone]   [facility] [phone]

## 2024-09-03 NOTE — PROGRESS NOTES
08/26/24 1407   Housing Stability   In the last 12 months, was there a time when you were not able to pay the mortgage or rent on time? N   In the past 12 months, how many times have you moved where you were living? 0   At any time in the past 12 months, were you homeless or living in a shelter (including now)? N   Transportation Needs   In the past 12 months, has lack of transportation kept you from medical appointments or from getting medications? no   In the past 12 months, has lack of transportation kept you from meetings, work, or from getting things needed for daily living? No   Food Insecurity   Within the past 12 months, you worried that your food would run out before you got the money to buy more. Never true  (Pt reports that he was just approved for food stamps.)   Within the past 12 months, the food you bought just didn't last and you didn't have money to get more. Never true   Intimate Partner Violence   Within the last year, have you been afraid of your partner or ex-partner? No   Within the last year, have you been humiliated or emotionally abused in other ways by your partner or ex-partner? No   Within the last year, have you been kicked, hit, slapped, or otherwise physically hurt by your partner or ex-partner? No   Within the last year, have you been raped or forced to have any kind of sexual activity by your partner or ex-partner? No   Alcohol Use   Q1: How often do you have a drink containing alcohol? 4 or more ti   Q2: How many drinks containing alcohol do you have on a typical day when you are drinking? 10 or more   Q3: How often do you have six or more drinks on one occasion? Daily   Utilities   In the past 12 months has the electric, gas, oil, or water company threatened to shut off services in your home? Yes

## 2024-09-03 NOTE — PROGRESS NOTES
Discharge Planning   Living Arrangements Lives Alone   Support Systems Self;Family members;Son   Assistance Needed rehab   Type of Current Residence Private residence   Current Home Care Services No   Other Referral/Resources/Interventions Provided:   Financial Resources Provided Indigent Transportation  (Pt provided transportatsion home on marginal date.)   Referrals Provided: Crisis Hotline;Other (Specify)  (Pt provided with crisis resources, PO, and [facility] IP contact to coordinate treatment.)   Discharge Communications   Discharge planning discussed with: Pt   Discharge Notes: CM informed that Pt is medically stable for discharge into the community.    Pt denies symptoms of withdrawal.  Pt was accepted to 2 facilities but opted to be discharged home to fix the pending termination of his medicaid.  Pt provided with contact information to coordinate admission.   Contacts   Reason/Outcome Discharge Planning